# Patient Record
Sex: FEMALE | Race: BLACK OR AFRICAN AMERICAN | NOT HISPANIC OR LATINO | Employment: FULL TIME | URBAN - METROPOLITAN AREA
[De-identification: names, ages, dates, MRNs, and addresses within clinical notes are randomized per-mention and may not be internally consistent; named-entity substitution may affect disease eponyms.]

---

## 2017-03-23 ENCOUNTER — HISTORICAL (OUTPATIENT)
Dept: ADMINISTRATIVE | Facility: HOSPITAL | Age: 57
End: 2017-03-23

## 2017-07-27 ENCOUNTER — HISTORICAL (OUTPATIENT)
Dept: ADMINISTRATIVE | Facility: HOSPITAL | Age: 57
End: 2017-07-27

## 2017-09-22 ENCOUNTER — HISTORICAL (OUTPATIENT)
Dept: ADMINISTRATIVE | Facility: HOSPITAL | Age: 57
End: 2017-09-22

## 2017-10-19 ENCOUNTER — HISTORICAL (OUTPATIENT)
Dept: RADIOLOGY | Facility: HOSPITAL | Age: 57
End: 2017-10-19

## 2017-11-08 ENCOUNTER — HISTORICAL (OUTPATIENT)
Dept: ADMINISTRATIVE | Facility: HOSPITAL | Age: 57
End: 2017-11-08

## 2018-03-22 ENCOUNTER — HISTORICAL (OUTPATIENT)
Dept: ADMINISTRATIVE | Facility: HOSPITAL | Age: 58
End: 2018-03-22

## 2018-04-16 ENCOUNTER — HISTORICAL (OUTPATIENT)
Dept: ADMINISTRATIVE | Facility: HOSPITAL | Age: 58
End: 2018-04-16

## 2018-07-20 ENCOUNTER — HISTORICAL (OUTPATIENT)
Dept: ADMINISTRATIVE | Facility: HOSPITAL | Age: 58
End: 2018-07-20

## 2019-02-25 LAB
INFLUENZA A ANTIGEN, POC: NEGATIVE
INFLUENZA B ANTIGEN, POC: NEGATIVE

## 2019-03-21 ENCOUNTER — HISTORICAL (OUTPATIENT)
Dept: ADMINISTRATIVE | Facility: HOSPITAL | Age: 59
End: 2019-03-21

## 2019-04-17 ENCOUNTER — HISTORICAL (OUTPATIENT)
Dept: RADIOLOGY | Facility: HOSPITAL | Age: 59
End: 2019-04-17

## 2019-05-13 ENCOUNTER — HISTORICAL (OUTPATIENT)
Dept: RADIOLOGY | Facility: HOSPITAL | Age: 59
End: 2019-05-13

## 2019-05-15 ENCOUNTER — HISTORICAL (OUTPATIENT)
Dept: RADIOLOGY | Facility: HOSPITAL | Age: 59
End: 2019-05-15

## 2019-06-04 ENCOUNTER — HISTORICAL (OUTPATIENT)
Dept: PREADMISSION TESTING | Facility: HOSPITAL | Age: 59
End: 2019-06-04

## 2019-06-13 ENCOUNTER — HISTORICAL (OUTPATIENT)
Dept: RADIOLOGY | Facility: HOSPITAL | Age: 59
End: 2019-06-13

## 2019-06-14 ENCOUNTER — HISTORICAL (OUTPATIENT)
Dept: ADMINISTRATIVE | Facility: HOSPITAL | Age: 59
End: 2019-06-14

## 2019-06-28 ENCOUNTER — HISTORICAL (OUTPATIENT)
Dept: ADMINISTRATIVE | Facility: HOSPITAL | Age: 59
End: 2019-06-28

## 2019-07-11 ENCOUNTER — HISTORICAL (OUTPATIENT)
Dept: RADIOLOGY | Facility: HOSPITAL | Age: 59
End: 2019-07-11

## 2019-07-11 LAB — BMD RECOMMENDATION EXT: NORMAL

## 2019-08-05 ENCOUNTER — HISTORICAL (OUTPATIENT)
Dept: ADMINISTRATIVE | Facility: HOSPITAL | Age: 59
End: 2019-08-05

## 2019-11-25 ENCOUNTER — HISTORICAL (OUTPATIENT)
Dept: RADIOLOGY | Facility: HOSPITAL | Age: 59
End: 2019-11-25

## 2019-11-25 LAB — POC CREATININE: 1.1 MG/DL (ref 0.6–1.3)

## 2020-02-11 ENCOUNTER — HISTORICAL (OUTPATIENT)
Dept: RADIOLOGY | Facility: HOSPITAL | Age: 60
End: 2020-02-11

## 2020-03-10 ENCOUNTER — HISTORICAL (OUTPATIENT)
Dept: ADMINISTRATIVE | Facility: HOSPITAL | Age: 60
End: 2020-03-10

## 2020-04-29 ENCOUNTER — HISTORICAL (OUTPATIENT)
Dept: ADMINISTRATIVE | Facility: HOSPITAL | Age: 60
End: 2020-04-29

## 2020-04-29 LAB
ABS NEUT (OLG): 6.15 X10(3)/MCL (ref 2.1–9.2)
ALBUMIN SERPL-MCNC: 3.9 GM/DL (ref 3.4–4.8)
ALBUMIN/GLOB SERPL: 1.1 RATIO (ref 1.1–2)
ALP SERPL-CCNC: 104 UNIT/L (ref 40–150)
ALT SERPL-CCNC: 12 UNIT/L (ref 0–55)
AST SERPL-CCNC: 20 UNIT/L (ref 5–34)
BASOPHILS # BLD AUTO: 0.1 X10(3)/MCL (ref 0–0.2)
BASOPHILS NFR BLD AUTO: 1 %
BILIRUB SERPL-MCNC: 1.1 MG/DL
BILIRUBIN DIRECT+TOT PNL SERPL-MCNC: 0.4 MG/DL (ref 0–0.5)
BILIRUBIN DIRECT+TOT PNL SERPL-MCNC: 0.7 MG/DL (ref 0–0.8)
BUN SERPL-MCNC: 14 MG/DL (ref 9.8–20.1)
CALCIUM SERPL-MCNC: 9.4 MG/DL (ref 8.4–10.2)
CHLORIDE SERPL-SCNC: 104 MMOL/L (ref 98–107)
CHOLEST SERPL-MCNC: 217 MG/DL
CHOLEST/HDLC SERPL: 4 {RATIO} (ref 0–5)
CO2 SERPL-SCNC: 30 MMOL/L (ref 23–31)
CREAT SERPL-MCNC: 0.91 MG/DL (ref 0.55–1.02)
DEPRECATED CALCIDIOL+CALCIFEROL SERPL-MC: 23.6 NG/ML (ref 6.6–49.9)
EOSINOPHIL # BLD AUTO: 0.2 X10(3)/MCL (ref 0–0.9)
EOSINOPHIL NFR BLD AUTO: 2 %
ERYTHROCYTE [DISTWIDTH] IN BLOOD BY AUTOMATED COUNT: 14.2 % (ref 11.5–17)
GLOBULIN SER-MCNC: 3.4 GM/DL (ref 2.4–3.5)
GLUCOSE SERPL-MCNC: 100 MG/DL (ref 82–115)
HCT VFR BLD AUTO: 40.3 % (ref 37–47)
HDLC SERPL-MCNC: 56 MG/DL (ref 35–60)
HGB BLD-MCNC: 13.5 GM/DL (ref 12–16)
LDLC SERPL CALC-MCNC: 132 MG/DL (ref 50–140)
LYMPHOCYTES # BLD AUTO: 2.6 X10(3)/MCL (ref 0.6–4.6)
LYMPHOCYTES NFR BLD AUTO: 27 %
MCH RBC QN AUTO: 28.3 PG (ref 27–31)
MCHC RBC AUTO-ENTMCNC: 33.5 GM/DL (ref 33–36)
MCV RBC AUTO: 84.5 FL (ref 80–94)
MONOCYTES # BLD AUTO: 0.6 X10(3)/MCL (ref 0.1–1.3)
MONOCYTES NFR BLD AUTO: 6 %
NEUTROPHILS # BLD AUTO: 6.15 X10(3)/MCL (ref 2.1–9.2)
NEUTROPHILS NFR BLD AUTO: 63 %
PLATELET # BLD AUTO: 327 X10(3)/MCL (ref 130–400)
PMV BLD AUTO: 10.4 FL (ref 9.4–12.4)
POTASSIUM SERPL-SCNC: 4.4 MMOL/L (ref 3.5–5.1)
PROT SERPL-MCNC: 7.3 GM/DL (ref 5.8–7.6)
RBC # BLD AUTO: 4.77 X10(6)/MCL (ref 4.2–5.4)
SODIUM SERPL-SCNC: 140 MMOL/L (ref 136–145)
TRIGL SERPL-MCNC: 143 MG/DL (ref 37–140)
TSH SERPL-ACNC: 0.4 UIU/ML (ref 0.35–4.94)
VLDLC SERPL CALC-MCNC: 29 MG/DL
WBC # SPEC AUTO: 9.7 X10(3)/MCL (ref 4.5–11.5)

## 2020-05-14 ENCOUNTER — HISTORICAL (OUTPATIENT)
Dept: HEMATOLOGY/ONCOLOGY | Facility: CLINIC | Age: 60
End: 2020-05-14

## 2020-05-14 ENCOUNTER — HISTORICAL (OUTPATIENT)
Dept: LAB | Facility: HOSPITAL | Age: 60
End: 2020-05-14

## 2020-05-14 LAB
ABS NEUT (OLG): 7.4 X10(3)/MCL (ref 2.1–9.2)
ALBUMIN SERPL-MCNC: 3.9 GM/DL (ref 3.4–4.8)
ALBUMIN/GLOB SERPL: 1.2 RATIO (ref 1.1–2)
ALP SERPL-CCNC: 101 UNIT/L (ref 40–150)
ALT SERPL-CCNC: 11 UNIT/L (ref 0–55)
AST SERPL-CCNC: 19 UNIT/L (ref 5–34)
BASOPHILS # BLD AUTO: 0 X10(3)/MCL (ref 0–0.2)
BASOPHILS NFR BLD AUTO: 0.5 %
BILIRUB SERPL-MCNC: 0.8 MG/DL
BILIRUBIN DIRECT+TOT PNL SERPL-MCNC: 0.3 MG/DL (ref 0–0.5)
BILIRUBIN DIRECT+TOT PNL SERPL-MCNC: 0.5 MG/DL (ref 0–0.8)
BUN SERPL-MCNC: 13.4 MG/DL (ref 9.8–20.1)
CALCIUM SERPL-MCNC: 9.3 MG/DL (ref 8.4–10.2)
CHLORIDE SERPL-SCNC: 105 MMOL/L (ref 98–107)
CO2 SERPL-SCNC: 32 MMOL/L (ref 23–31)
CREAT SERPL-MCNC: 0.9 MG/DL (ref 0.55–1.02)
EOSINOPHIL # BLD AUTO: 0.1 X10(3)/MCL (ref 0–0.9)
EOSINOPHIL NFR BLD AUTO: 1.2 %
ERYTHROCYTE [DISTWIDTH] IN BLOOD BY AUTOMATED COUNT: 13.6 % (ref 11.5–17)
GLOBULIN SER-MCNC: 3.2 GM/DL (ref 2.4–3.5)
GLUCOSE SERPL-MCNC: 71 MG/DL (ref 82–115)
HCT VFR BLD AUTO: 38.5 % (ref 37–47)
HGB BLD-MCNC: 13.2 GM/DL (ref 12–16)
LYMPHOCYTES # BLD AUTO: 2.8 X10(3)/MCL (ref 0.6–4.6)
LYMPHOCYTES NFR BLD AUTO: 25.5 %
MCH RBC QN AUTO: 28.4 PG (ref 27–31)
MCHC RBC AUTO-ENTMCNC: 34.3 GM/DL (ref 33–36)
MCV RBC AUTO: 83 FL (ref 80–94)
MONOCYTES # BLD AUTO: 0.6 X10(3)/MCL (ref 0.1–1.3)
MONOCYTES NFR BLD AUTO: 5.8 %
NEUTROPHILS # BLD AUTO: 7.4 X10(3)/MCL (ref 2.1–9.2)
NEUTROPHILS NFR BLD AUTO: 66.7 %
PLATELET # BLD AUTO: 313 X10(3)/MCL (ref 130–400)
PMV BLD AUTO: 9.8 FL (ref 9.4–12.4)
POTASSIUM SERPL-SCNC: 4 MMOL/L (ref 3.5–5.1)
PROT SERPL-MCNC: 7.1 GM/DL (ref 5.8–7.6)
RBC # BLD AUTO: 4.64 X10(6)/MCL (ref 4.2–5.4)
SODIUM SERPL-SCNC: 143 MMOL/L (ref 136–145)
WBC # SPEC AUTO: 11.1 X10(3)/MCL (ref 4.5–11.5)

## 2020-10-02 LAB — CRC RECOMMENDATION EXT: NORMAL

## 2020-11-17 ENCOUNTER — HISTORICAL (OUTPATIENT)
Dept: HEMATOLOGY/ONCOLOGY | Facility: CLINIC | Age: 60
End: 2020-11-17

## 2020-12-01 ENCOUNTER — HISTORICAL (OUTPATIENT)
Dept: HEMATOLOGY/ONCOLOGY | Facility: CLINIC | Age: 60
End: 2020-12-01

## 2020-12-07 ENCOUNTER — HISTORICAL (OUTPATIENT)
Dept: LAB | Facility: HOSPITAL | Age: 60
End: 2020-12-07

## 2021-01-14 ENCOUNTER — HISTORICAL (OUTPATIENT)
Dept: ADMINISTRATIVE | Facility: HOSPITAL | Age: 61
End: 2021-01-14

## 2021-02-08 ENCOUNTER — HISTORICAL (OUTPATIENT)
Dept: HEMATOLOGY/ONCOLOGY | Facility: CLINIC | Age: 61
End: 2021-02-08

## 2021-05-11 ENCOUNTER — HISTORICAL (OUTPATIENT)
Dept: ADMINISTRATIVE | Facility: HOSPITAL | Age: 61
End: 2021-05-11

## 2021-08-05 ENCOUNTER — HISTORICAL (OUTPATIENT)
Dept: ADMINISTRATIVE | Facility: HOSPITAL | Age: 61
End: 2021-08-05

## 2021-09-03 ENCOUNTER — HISTORICAL (OUTPATIENT)
Dept: ADMINISTRATIVE | Facility: HOSPITAL | Age: 61
End: 2021-09-03

## 2021-11-03 ENCOUNTER — HISTORICAL (OUTPATIENT)
Dept: ADMINISTRATIVE | Facility: HOSPITAL | Age: 61
End: 2021-11-03

## 2021-11-05 ENCOUNTER — HISTORICAL (OUTPATIENT)
Dept: RADIOLOGY | Facility: HOSPITAL | Age: 61
End: 2021-11-05

## 2021-11-09 ENCOUNTER — HISTORICAL (OUTPATIENT)
Dept: RADIOLOGY | Facility: HOSPITAL | Age: 61
End: 2021-11-09

## 2021-12-07 ENCOUNTER — HISTORICAL (OUTPATIENT)
Dept: ADMINISTRATIVE | Facility: HOSPITAL | Age: 61
End: 2021-12-07

## 2022-02-10 ENCOUNTER — HISTORICAL (OUTPATIENT)
Dept: HEMATOLOGY/ONCOLOGY | Facility: CLINIC | Age: 62
End: 2022-02-10

## 2022-02-10 LAB
ABS NEUT (OLG): 8.14 (ref 2.1–9.2)
ALBUMIN SERPL-MCNC: 3.9 G/DL (ref 3.4–4.8)
ALBUMIN/GLOB SERPL: 1.2 {RATIO} (ref 1.1–2)
ALP SERPL-CCNC: 106 U/L (ref 40–150)
ALT SERPL-CCNC: 15 U/L (ref 0–55)
AST SERPL-CCNC: 22 U/L (ref 5–34)
BASOPHILS # BLD AUTO: 0.1 10*3/UL (ref 0–0.2)
BASOPHILS NFR BLD AUTO: 0.6 %
BILIRUB SERPL-MCNC: 0.8 MG/DL
BILIRUBIN DIRECT+TOT PNL SERPL-MCNC: 0.3 (ref 0–0.5)
BILIRUBIN DIRECT+TOT PNL SERPL-MCNC: 0.5 (ref 0–0.8)
BUN SERPL-MCNC: 11.4 MG/DL (ref 9.8–20.1)
CALCIUM SERPL-MCNC: 9.5 MG/DL (ref 8.7–10.5)
CHLORIDE SERPL-SCNC: 105 MMOL/L (ref 98–107)
CO2 SERPL-SCNC: 26 MMOL/L (ref 23–31)
CREAT SERPL-MCNC: 0.85 MG/DL (ref 0.55–1.02)
EOSINOPHIL # BLD AUTO: 0.2 10*3/UL (ref 0–0.9)
EOSINOPHIL NFR BLD AUTO: 1.2 %
ERYTHROCYTE [DISTWIDTH] IN BLOOD BY AUTOMATED COUNT: 13.4 % (ref 11.5–17)
GLOBULIN SER-MCNC: 3.2 G/DL (ref 2.4–3.5)
GLUCOSE SERPL-MCNC: 88 MG/DL (ref 82–115)
HCT VFR BLD AUTO: 38 % (ref 37–47)
HEMOLYSIS INTERF INDEX SERPL-ACNC: 7
HGB BLD-MCNC: 13.1 G/DL (ref 12–16)
ICTERIC INTERF INDEX SERPL-ACNC: 1
LIPEMIC INTERF INDEX SERPL-ACNC: 13
LYMPHOCYTES # BLD AUTO: 3 10*3/UL (ref 0.6–4.6)
LYMPHOCYTES NFR BLD AUTO: 24.6 %
MANUAL DIFF? (OHS): NO
MCH RBC QN AUTO: 28.5 PG (ref 27–31)
MCHC RBC AUTO-ENTMCNC: 34.5 G/DL (ref 33–36)
MCV RBC AUTO: 82.8 FL (ref 80–94)
MONOCYTES # BLD AUTO: 0.8 10*3/UL (ref 0.1–1.3)
MONOCYTES NFR BLD AUTO: 6.3 %
NEUTROPHILS # BLD AUTO: 8.1 10*3/UL (ref 2.1–9.2)
NEUTROPHILS NFR BLD AUTO: 67.1 %
PLATELET # BLD AUTO: 342 10*3/UL (ref 130–400)
PMV BLD AUTO: 9.6 FL (ref 9.4–12.4)
POTASSIUM SERPL-SCNC: 3.8 MMOL/L (ref 3.5–5.1)
PROT SERPL-MCNC: 7.1 G/DL (ref 5.8–7.6)
RBC # BLD AUTO: 4.59 10*6/UL (ref 4.2–5.4)
SODIUM SERPL-SCNC: 141 MMOL/L (ref 136–145)
WBC # SPEC AUTO: 12.1 10*3/UL (ref 4.5–11.5)

## 2022-02-15 ENCOUNTER — HISTORICAL (OUTPATIENT)
Dept: RADIOLOGY | Facility: HOSPITAL | Age: 62
End: 2022-02-15

## 2022-02-15 ENCOUNTER — HISTORICAL (OUTPATIENT)
Dept: ADMINISTRATIVE | Facility: HOSPITAL | Age: 62
End: 2022-02-15

## 2022-02-15 LAB — BCS RECOMMENDATION EXT: NORMAL

## 2022-04-07 ENCOUNTER — HISTORICAL (OUTPATIENT)
Dept: ADMINISTRATIVE | Facility: HOSPITAL | Age: 62
End: 2022-04-07
Payer: COMMERCIAL

## 2022-04-11 ENCOUNTER — HISTORICAL (OUTPATIENT)
Dept: ADMINISTRATIVE | Facility: HOSPITAL | Age: 62
End: 2022-04-11

## 2022-04-11 LAB — TSH SERPL-ACNC: 2.11 M[IU]/L (ref 0.35–4.94)

## 2022-04-23 VITALS
HEIGHT: 60 IN | BODY MASS INDEX: 39.34 KG/M2 | DIASTOLIC BLOOD PRESSURE: 74 MMHG | OXYGEN SATURATION: 96 % | WEIGHT: 200.38 LBS | SYSTOLIC BLOOD PRESSURE: 112 MMHG

## 2022-05-01 NOTE — HISTORICAL [facility] C[facility]ER
This is a historical note converted from [facility]. Formatting and pictures may have been removed.  Please reference [facility] for original formatting and attached multimedia. DATE OF SERVICE: 6/14/2019  ?   SURGEON: Dr. Rosario Cook  ?   ASSIST: None  ?   PREOPERATIVE DIAGNOSIS: Left breast atypical ductal hyperplasia  ?  POSTOPERATIVE DIAGNOSIS: Left breast atypical ductal hyperplasia  ?  PROCEDURE: Left breast excisional biopsy with preop seed localization  ?   ANESTHESIA: General + 30ml of 0.5% Bupivacaine  ?  ESTIMATED BLOOD LOSS: 3 mL  ?  FINDINGS: Left breast tissue with seed, clip and area of concern  ?  SPECIMEN: Left breast excisional biopsy  ?  DRAINS: None  ?  COMPLICATIONS: None  ?  PROCEDURE INDICATION:  Shahnaz Chirinos is a pleasant 59 year old female who presents with left breast atypical ductal hyperplasia on core needle biopsy but the pathologist could not rule out ductal carcinoma in situ.  ?  Her imaging studies were reviewed in detail to facilitate discussion. At present her imaging is BIRADS-4 further biopsy shows a Left atypical ductal hyperplasia?and cannot rule out?DCIS. This is a proliferative high risk lesion and may have an upgrade of 15-20% to a cancer diagnosis on excisional biopsy and there is already concern for malignancy based on the core biopsy. Surgical biopsy can be done on outpatient basis under local anesthesia and sedation. The risks and complications of pain, bleeding, infection, hematoma, seroma, additional surgery, and scarring were explained. The details of the surgery were described in depth. All of the patients questions were answered. We will schedule her for an excisional biopsy.?  ?  PROCEDURE DESCRIPTION:  The patient was then brought to the operating room and placed supine on the operative table. General anesthesia was initiated. The skin of the?Left breast was prepped and draped in standard sterile surgical fashion along with the Left axilla and upper arm. A time-out was  completed verifying correct patient, procedure, site, positioning and equipment prior to beginning the procedure.?  ?  An circumareolar?incision was planned of the Left breast. The incision was planned such that the seed may be included into the excision field. The incision was made using a 15-blade. The subcutaneous tissue was deepened using electrocautery. Hemostasis was checked and maintained. The seed was located within the surgical field. The dissection was carried out circumferentially to include the seed. The specimen was then completed dissected free. Using intra-operative imaging with Faxitron, an x-ray film of the specimen was taken and reviewed. It was confirmed that the seed, clip?and lesion were within the specimen. The specimen was then sent to pathology. Hemostasis was again checked and obtained. Irrigation was performed of the cavity.  ?  The cavity was closed with interrupted 3-0 Vicryl sutures. The subdermal was closed with 3-0 Vicryl and 4-0 subcuticular running skin closure. Dermabond was applied over the incision followed by 4x4 gauze and surgical bra  ?  All instruments and sponge counts were correct at the end of the procedure. The patient was awaken from anesthesia and taken to the post-anesthesia care unit in stable condition.

## 2022-06-07 ENCOUNTER — DOCUMENTATION ONLY (OUTPATIENT)
Dept: ADMINISTRATIVE | Facility: HOSPITAL | Age: 62
End: 2022-06-07
Payer: COMMERCIAL

## 2022-06-10 ENCOUNTER — DOCUMENTATION ONLY (OUTPATIENT)
Dept: ADMINISTRATIVE | Facility: HOSPITAL | Age: 62
End: 2022-06-10
Payer: COMMERCIAL

## 2022-06-17 ENCOUNTER — DOCUMENTATION ONLY (OUTPATIENT)
Dept: ADMINISTRATIVE | Facility: HOSPITAL | Age: 62
End: 2022-06-17
Payer: COMMERCIAL

## 2022-07-05 ENCOUNTER — TELEPHONE (OUTPATIENT)
Dept: INTERNAL MEDICINE | Facility: CLINIC | Age: 62
End: 2022-07-05
Payer: COMMERCIAL

## 2022-07-05 RX ORDER — AZITHROMYCIN 250 MG/1
TABLET, FILM COATED ORAL
Qty: 6 TABLET | Refills: 0 | Status: SHIPPED | OUTPATIENT
Start: 2022-07-05 | End: 2022-07-10

## 2022-07-05 RX ORDER — AZITHROMYCIN 250 MG/1
TABLET, FILM COATED ORAL
Qty: 6 TABLET | Refills: 0 | Status: SHIPPED | OUTPATIENT
Start: 2022-07-05 | End: 2022-07-05

## 2022-07-05 NOTE — TELEPHONE ENCOUNTER
----- Message from Shonna Hampton MA sent at 7/5/2022  3:56 PM CDT -----  Contact: Patient    ----- Message -----  From: Boone Lynne  Sent: 7/1/2022   3:01 PM CDT  To: [facility] CHAVEZ Staff    The pt called and would like to have someone call her back     She has a sinus infection and would like to know if you would prescribe a Z-Pac for her    Please call her back at [phone]

## 2022-07-05 NOTE — TELEPHONE ENCOUNTER
Medications Ordered This Encounter   Medications    azithromycin (Z-DAVIAN) 250 MG tablet     Sig: Take 2 tablets by mouth on day 1; Take 1 tablet by mouth on days 2-5     Dispense:  6 tablet     Refill:  0     Sent

## 2022-07-05 NOTE — TELEPHONE ENCOUNTER
----- Message from Shonna Hampton MA sent at 7/5/2022  8:43 AM CDT -----  Contact: Patient    ----- Message -----  From: Boone Lynne  Sent: 7/1/2022   3:01 PM CDT  To: [facility] CHAVEZ Staff    The pt called and would like to have someone call her back     She has a sinus infection and would like to know if you would prescribe a Z-Pac for her    Please call her back at [phone]

## 2022-07-05 NOTE — TELEPHONE ENCOUNTER
Medications Ordered This Encounter   Medications    azithromycin (Z-DAVIAN) 250 MG tablet     Sig: Take 2 tablets by mouth on day 1; Take 1 tablet by mouth on days 2-5     Dispense:  6 tablet     Refill:  0    please notify

## 2022-07-11 ENCOUNTER — TELEPHONE (OUTPATIENT)
Dept: INTERNAL MEDICINE | Facility: CLINIC | Age: 62
End: 2022-07-11
Payer: COMMERCIAL

## 2022-07-11 NOTE — TELEPHONE ENCOUNTER
----- Message from Margarita Oakley MA sent at 7/1/2022  8:51 AM CDT -----  Regarding: Rx sent to pharmacy  Pt called with c/o HA,runny nose, sneezing, and sore throat. Pt want to know if something can be called into her pharmacy? Please advise

## 2022-08-15 PROBLEM — E55.9 VITAMIN D DEFICIENCY: Status: ACTIVE | Noted: 2022-08-15

## 2022-08-15 PROBLEM — D05.10 DUCTAL CARCINOMA IN SITU (DCIS) OF BREAST: Status: ACTIVE | Noted: 2022-08-15

## 2022-08-15 PROBLEM — K21.9 GASTRIC REFLUX: Status: ACTIVE | Noted: 2022-08-15

## 2022-08-15 PROBLEM — E03.4 ACQUIRED ATROPHY OF THYROID: Status: ACTIVE | Noted: 2022-08-15

## 2022-08-15 PROBLEM — E66.9 OBESITY: Status: ACTIVE | Noted: 2022-08-15

## 2022-08-15 PROBLEM — Z00.00 WELLNESS EXAMINATION: Status: ACTIVE | Noted: 2022-08-15

## 2022-08-16 DIAGNOSIS — E03.9 ACQUIRED HYPOTHYROIDISM: Primary | ICD-10-CM

## 2022-08-17 ENCOUNTER — OFFICE VISIT (OUTPATIENT)
Dept: INTERNAL MEDICINE | Facility: CLINIC | Age: 62
End: 2022-08-17
Payer: COMMERCIAL

## 2022-08-17 VITALS
OXYGEN SATURATION: 99 % | HEART RATE: 74 BPM | TEMPERATURE: 97 F | HEIGHT: 61 IN | BODY MASS INDEX: 36.82 KG/M2 | DIASTOLIC BLOOD PRESSURE: 84 MMHG | WEIGHT: 195 LBS | SYSTOLIC BLOOD PRESSURE: 123 MMHG

## 2022-08-17 DIAGNOSIS — K21.9 GASTRIC REFLUX: Primary | ICD-10-CM

## 2022-08-17 DIAGNOSIS — E55.9 VITAMIN D DEFICIENCY: ICD-10-CM

## 2022-08-17 DIAGNOSIS — E03.9 ACQUIRED HYPOTHYROIDISM: ICD-10-CM

## 2022-08-17 DIAGNOSIS — E03.9 ACQUIRED HYPOTHYROIDISM: Primary | ICD-10-CM

## 2022-08-17 DIAGNOSIS — Z00.00 WELLNESS EXAMINATION: ICD-10-CM

## 2022-08-17 PROCEDURE — 99214 OFFICE O/P EST MOD 30 MIN: CPT | Mod: ,,, | Performed by: INTERNAL MEDICINE

## 2022-08-17 PROCEDURE — 3079F PR MOST RECENT DIASTOLIC BLOOD PRESSURE 80-89 MM HG: ICD-10-PCS | Mod: CPTII,,, | Performed by: INTERNAL MEDICINE

## 2022-08-17 PROCEDURE — 3008F PR BODY MASS INDEX (BMI) DOCUMENTED: ICD-10-PCS | Mod: CPTII,,, | Performed by: INTERNAL MEDICINE

## 2022-08-17 PROCEDURE — 1159F MED LIST DOCD IN RCRD: CPT | Mod: CPTII,,, | Performed by: INTERNAL MEDICINE

## 2022-08-17 PROCEDURE — 1160F PR REVIEW ALL MEDS BY PRESCRIBER/CLIN PHARMACIST DOCUMENTED: ICD-10-PCS | Mod: CPTII,,, | Performed by: INTERNAL MEDICINE

## 2022-08-17 PROCEDURE — 1160F RVW MEDS BY RX/DR IN RCRD: CPT | Mod: CPTII,,, | Performed by: INTERNAL MEDICINE

## 2022-08-17 PROCEDURE — 3079F DIAST BP 80-89 MM HG: CPT | Mod: CPTII,,, | Performed by: INTERNAL MEDICINE

## 2022-08-17 PROCEDURE — 3074F SYST BP LT 130 MM HG: CPT | Mod: CPTII,,, | Performed by: INTERNAL MEDICINE

## 2022-08-17 PROCEDURE — 3008F BODY MASS INDEX DOCD: CPT | Mod: CPTII,,, | Performed by: INTERNAL MEDICINE

## 2022-08-17 PROCEDURE — 1159F PR MEDICATION LIST DOCUMENTED IN MEDICAL RECORD: ICD-10-PCS | Mod: CPTII,,, | Performed by: INTERNAL MEDICINE

## 2022-08-17 PROCEDURE — 3074F PR MOST RECENT SYSTOLIC BLOOD PRESSURE < 130 MM HG: ICD-10-PCS | Mod: CPTII,,, | Performed by: INTERNAL MEDICINE

## 2022-08-17 PROCEDURE — 99214 PR OFFICE/OUTPT VISIT, EST, LEVL IV, 30-39 MIN: ICD-10-PCS | Mod: ,,, | Performed by: INTERNAL MEDICINE

## 2022-08-17 NOTE — PROGRESS NOTES
Subjective:      Patient ID: Shahnaz Chirinos is a 62 y.o. female.    Chief Complaint: Follow-up (4 month f/u)    Ms. Christie is a 62-year-old female with hypothyroidism, vitamin D deficiency, GERD, DDD and atypical ductal hyperplasia of her left breast for which she is currently on Femara after excision of her breast lesion. Also has obstructive sleep apnea and needs to get her CPAP order completed.  No acute needs or complaints today.   TSH is reviewed and is essentially normal    Wellness: 2021  MM2022  CRS: 10/2021  DEXA: 2019  Pap: Hysterectomy      The patient's Health Maintenance was reviewed and the following appears to be due at this time:   Health Maintenance Due   Topic Date Due    Hepatitis C Screening  Never done    HIV Screening  Never done    TETANUS VACCINE  Never done    Shingles Vaccine (1 of 2) Never done    COVID-19 Vaccine (3 - Booster for Pfizer series) 2021        Past Medical History:  Past Medical History:   Diagnosis Date    Abdominal pain     Achilles tendonitis     Acquired hypothyroidism     Acute sinusitis     Cholelithiasis     DCIS (ductal carcinoma in situ)     Gastric reflux     Malaise and fatigue     Osteopenia     Vitamin D deficiency      Past Surgical History:   Procedure Laterality Date     SECTION      EXC. OF BREAST LESION ID BY RADIOLOGICAL Left     KNEE SURGERY      LAPAROSCOPIC CHOLECYSTECTOMY      TOTAL ABDOMINAL HYSTERECTOMY      Per [facility]     Review of patient's allergies indicates:  No Known Allergies  Social History     Socioeconomic History    Marital status:    Tobacco Use    Smoking status: Never Smoker    Smokeless tobacco: Never Used   Substance and Sexual Activity    Alcohol use: Yes     Family History   Problem Relation Age of Onset    Heart failure Mother     COPD Mother     Cancer Father     Diabetes Brother     Peripheral vascular disease Brother     Lung cancer Brother        Review of  "Systems   Constitutional: Negative for activity change, appetite change and fatigue.   HENT: Negative for postnasal drip, rhinorrhea, sinus pain and sore throat.    Eyes: Negative for visual disturbance.   Respiratory: Negative for cough, shortness of breath and wheezing.    Cardiovascular: Negative for chest pain and palpitations.   Gastrointestinal: Negative for abdominal distention, blood in stool, constipation, diarrhea, nausea and vomiting.   Genitourinary: Negative for dysuria, flank pain, frequency and hematuria.   Musculoskeletal: Negative for arthralgias, back pain and joint swelling.   Skin: Negative for rash and wound.   Neurological: Negative for dizziness, seizures, weakness and headaches.   Psychiatric/Behavioral: Negative for agitation and suicidal ideas.       Objective:   /84 (BP Location: Right arm, Patient Position: Sitting, BP Method: Large (Automatic))   Pulse 74   Temp 97.3 °F (36.3 °C)   Ht 5' 1" (1.549 m)   Wt 88.5 kg (195 lb)   SpO2 99%   BMI 36.84 kg/m²     Physical Exam  Constitutional:       Appearance: Normal appearance.   HENT:      Head: Normocephalic and atraumatic.      Nose: Nose normal.      Mouth/Throat:      Mouth: Mucous membranes are moist.      Pharynx: Oropharynx is clear.   Eyes:      Extraocular Movements: Extraocular movements intact.      Pupils: Pupils are equal, round, and reactive to light.   Cardiovascular:      Rate and Rhythm: Normal rate and regular rhythm.      Pulses: Normal pulses.   Pulmonary:      Effort: Pulmonary effort is normal.      Breath sounds: Normal breath sounds.   Abdominal:      General: Bowel sounds are normal.      Palpations: Abdomen is soft.   Musculoskeletal:         General: Normal range of motion.      Cervical back: Normal range of motion and neck supple.   Skin:     General: Skin is warm.   Neurological:      General: No focal deficit present.      Mental Status: She is alert and oriented to person, place, and time. Mental " status is at baseline.   Psychiatric:         Mood and Affect: Mood normal.       Assessment/ Plan:     Problem List Items Addressed This Visit        Endocrine    Acquired hypothyroidism     -TSH is reviewed and is well controlled at the current dose of Synthroid 75 mcg p.o. daily, continue              GI    Gastric reflux - Primary     -patient advised to avoid foods that trigger acid reflux and he had at least 2 hours before bedtime.                 No orders of the defined types were placed in this encounter.      Medication List with Changes/Refills   Current Medications    ASPIRIN (ECOTRIN) 81 MG EC TABLET    Take 81 mg by mouth.    CHOLECALCIFEROL, VITAMIN D3, 125 MCG (5,000 UNIT) TAB    Take 5,000 Units by mouth once daily.    FAMOTIDINE (PEPCID) 20 MG TABLET    Take 20 mg by mouth.    LETROZOLE (FEMARA) 2.5 MG TAB    Take 2.5 mg by mouth once daily.    LEVOTHYROXINE (SYNTHROID) 75 MCG TABLET    Take 75 mcg by mouth before breakfast.      Medication List with Changes/Refills   Current Medications    ASPIRIN (ECOTRIN) 81 MG EC TABLET    Take 81 mg by mouth.       Start Date: 9/1/2021  End Date: --    CHOLECALCIFEROL, VITAMIN D3, 125 MCG (5,000 UNIT) TAB    Take 5,000 Units by mouth once daily.       Start Date: --        End Date: --    FAMOTIDINE (PEPCID) 20 MG TABLET    Take 20 mg by mouth.       Start Date: 9/1/2021  End Date: --    LETROZOLE (FEMARA) 2.5 MG TAB    Take 2.5 mg by mouth once daily.       Start Date: --        End Date: --    LEVOTHYROXINE (SYNTHROID) 75 MCG TABLET    Take 75 mcg by mouth before breakfast.       Start Date: --        End Date: --

## 2022-08-18 NOTE — ASSESSMENT & PLAN NOTE
-TSH is reviewed and is well controlled at the current dose of Synthroid 75 mcg p.o. daily, continue

## 2022-09-15 ENCOUNTER — HISTORICAL (OUTPATIENT)
Dept: ADMINISTRATIVE | Facility: HOSPITAL | Age: 62
End: 2022-09-15
Payer: COMMERCIAL

## 2022-09-27 ENCOUNTER — OFFICE VISIT (OUTPATIENT)
Dept: INTERNAL MEDICINE | Facility: CLINIC | Age: 62
End: 2022-09-27
Payer: COMMERCIAL

## 2022-09-27 ENCOUNTER — PATIENT MESSAGE (OUTPATIENT)
Dept: INTERNAL MEDICINE | Facility: CLINIC | Age: 62
End: 2022-09-27

## 2022-09-27 VITALS
HEIGHT: 61 IN | OXYGEN SATURATION: 98 % | WEIGHT: 195 LBS | DIASTOLIC BLOOD PRESSURE: 74 MMHG | TEMPERATURE: 97 F | BODY MASS INDEX: 36.82 KG/M2 | SYSTOLIC BLOOD PRESSURE: 136 MMHG | HEART RATE: 73 BPM

## 2022-09-27 DIAGNOSIS — H66.91 OTITIS, RIGHT: ICD-10-CM

## 2022-09-27 DIAGNOSIS — H66.92 OTITIS, LEFT: Primary | ICD-10-CM

## 2022-09-27 DIAGNOSIS — E03.8 TSH (THYROID-STIMULATING HORMONE DEFICIENCY): Primary | ICD-10-CM

## 2022-09-27 DIAGNOSIS — D05.10 DUCTAL CARCINOMA IN SITU (DCIS) OF BREAST, UNSPECIFIED LATERALITY: ICD-10-CM

## 2022-09-27 PROCEDURE — 3078F PR MOST RECENT DIASTOLIC BLOOD PRESSURE < 80 MM HG: ICD-10-PCS | Mod: CPTII,,,

## 2022-09-27 PROCEDURE — 1159F PR MEDICATION LIST DOCUMENTED IN MEDICAL RECORD: ICD-10-PCS | Mod: CPTII,,,

## 2022-09-27 PROCEDURE — 99213 OFFICE O/P EST LOW 20 MIN: CPT | Mod: ,,,

## 2022-09-27 PROCEDURE — 1160F PR REVIEW ALL MEDS BY PRESCRIBER/CLIN PHARMACIST DOCUMENTED: ICD-10-PCS | Mod: CPTII,,,

## 2022-09-27 PROCEDURE — 3008F BODY MASS INDEX DOCD: CPT | Mod: CPTII,,,

## 2022-09-27 PROCEDURE — 3078F DIAST BP <80 MM HG: CPT | Mod: CPTII,,,

## 2022-09-27 PROCEDURE — 1159F MED LIST DOCD IN RCRD: CPT | Mod: CPTII,,,

## 2022-09-27 PROCEDURE — 99213 PR OFFICE/OUTPT VISIT, EST, LEVL III, 20-29 MIN: ICD-10-PCS | Mod: ,,,

## 2022-09-27 PROCEDURE — 3075F SYST BP GE 130 - 139MM HG: CPT | Mod: CPTII,,,

## 2022-09-27 PROCEDURE — 1160F RVW MEDS BY RX/DR IN RCRD: CPT | Mod: CPTII,,,

## 2022-09-27 PROCEDURE — 3008F PR BODY MASS INDEX (BMI) DOCUMENTED: ICD-10-PCS | Mod: CPTII,,,

## 2022-09-27 PROCEDURE — 3075F PR MOST RECENT SYSTOLIC BLOOD PRESS GE 130-139MM HG: ICD-10-PCS | Mod: CPTII,,,

## 2022-09-27 RX ORDER — LEVOTHYROXINE SODIUM 75 UG/1
75 TABLET ORAL
Qty: 90 TABLET | Refills: 1 | Status: SHIPPED | OUTPATIENT
Start: 2022-09-27 | End: 2023-10-18 | Stop reason: SDUPTHER

## 2022-09-27 RX ORDER — LETROZOLE 2.5 MG/1
2.5 TABLET, FILM COATED ORAL DAILY
Qty: 30 TABLET | Refills: 0 | Status: SHIPPED | OUTPATIENT
Start: 2022-09-27 | End: 2022-11-01

## 2022-09-27 RX ORDER — NEOMYCIN SULFATE, POLYMYXIN B SULFATE AND HYDROCORTISONE 10; 3.5; 1 MG/ML; MG/ML; [USP'U]/ML
3 SUSPENSION/ DROPS AURICULAR (OTIC) 3 TIMES DAILY
Qty: 6 ML | Refills: 0 | Status: SHIPPED | OUTPATIENT
Start: 2022-09-27 | End: 2022-10-07

## 2022-09-27 NOTE — PROGRESS NOTES
Patient ID: Shahnaz Chirinos is a 62 y.o. female.    Chief Complaint: Follow-up, Headache, and Otalgia    Ms. Christie is a 62-year-old female here today for requested visit regarding ear pain.  Medical comorbidities include hypothyroidism, vitamin D deficiency, GERD, DDD and atypical ductal hyperplasia of her left breast for which she is currently on Femara after excision of her breast lesion. Also has obstructive sleep apnea and needs to get her CPAP order completed.  Today presents with right ear pain with itching x2 days-see form below.     Otalgia   There is pain in the right ear. This is a new problem. The current episode started in the past 7 days. The problem occurs hourly. The problem has been unchanged. There has been no fever. The pain is mild. Associated symptoms include headaches. Pertinent negatives include no abdominal pain, coughing, diarrhea, ear discharge, hearing loss, rash, rhinorrhea, sore throat or vomiting. She has tried nothing for the symptoms.     Wellness: 2021  MM2022  CRS: 10/2021  DEXA: 2019  Pap: Hysterectomy    MEDICAL HISTORY:    Past Medical History:   Diagnosis Date    Abdominal pain     Achilles tendonitis     Acquired hypothyroidism     Acute sinusitis     Cholelithiasis     DCIS (ductal carcinoma in situ)     Gastric reflux     Malaise and fatigue     Osteopenia     Vitamin D deficiency       Past Surgical History:   Procedure Laterality Date     SECTION      EXC. OF BREAST LESION ID BY RADIOLOGICAL Left     KNEE SURGERY      LAPAROSCOPIC CHOLECYSTECTOMY      TOTAL ABDOMINAL HYSTERECTOMY      Per [facility]      Social History     Tobacco Use    Smoking status: Never    Smokeless tobacco: Never   Substance Use Topics    Alcohol use: Yes          Health Maintenance Due   Topic Date Due    Hepatitis C Screening  Never done    HIV Screening  Never done    TETANUS VACCINE  Never done    Shingles Vaccine (1 of 2) Never done    COVID-19 Vaccine (3 - Booster  "for Pfizer series) 03/29/2021    Influenza Vaccine (1) 09/01/2022          Patient Care Team:  Maia Oakes MD as PCP - General (Internal Medicine)  Shira Morrell MD as Consulting Physician (Hematology and Oncology)      Review of Systems   Constitutional:  Negative for fatigue and fever.   HENT:  Positive for ear pain (with itching). Negative for congestion, ear discharge, hearing loss, nosebleeds, postnasal drip, rhinorrhea, sinus pressure, sinus pain, sore throat, tinnitus and trouble swallowing.    Eyes:  Negative for redness and visual disturbance.   Respiratory:  Negative for cough, chest tightness and shortness of breath.    Cardiovascular:  Negative for chest pain and palpitations.   Gastrointestinal:  Negative for abdominal pain, constipation, diarrhea, nausea and vomiting.   Genitourinary:  Negative for dysuria, flank pain, frequency and urgency.   Musculoskeletal:  Negative for arthralgias, gait problem and myalgias.   Skin:  Negative for rash and wound.   Neurological:  Positive for headaches. Negative for facial asymmetry, speech difficulty and weakness.   All other systems reviewed and are negative.    Objective:   /74 (BP Location: Left arm, Patient Position: Sitting, BP Method: Large (Automatic))   Pulse 73   Temp 97.3 °F (36.3 °C)   Ht 5' 1" (1.549 m)   Wt 88.5 kg (195 lb)   SpO2 98%   BMI 36.84 kg/m²      Physical Exam  Constitutional:       General: She is not in acute distress.     Appearance: Normal appearance.   HENT:      Right Ear: Ear canal and external ear normal. Tenderness present. Tympanic membrane is erythematous.      Left Ear: Tympanic membrane, ear canal and external ear normal. There is impacted cerumen.      Nose: Nose normal.      Mouth/Throat:      Mouth: Mucous membranes are moist.      Pharynx: Oropharynx is clear.   Eyes:      Extraocular Movements: Extraocular movements intact.      Conjunctiva/sclera: Conjunctivae normal.      Pupils: Pupils are " equal, round, and reactive to light.   Cardiovascular:      Rate and Rhythm: Normal rate and regular rhythm.      Pulses: Normal pulses.      Heart sounds: Normal heart sounds. No murmur heard.    No gallop.   Pulmonary:      Effort: Pulmonary effort is normal.      Breath sounds: Normal breath sounds. No wheezing.   Abdominal:      General: Bowel sounds are normal. There is no distension.      Palpations: Abdomen is soft. There is no mass.      Tenderness: There is no abdominal tenderness. There is no guarding.   Musculoskeletal:         General: Normal range of motion.   Skin:     General: Skin is warm and dry.   Neurological:      Mental Status: She is alert. Mental status is at baseline.      Sensory: No sensory deficit.      Motor: No weakness.         Assessment:       ICD-10-CM ICD-9-CM   1. Otitis, left  H66.92 382.9   2. Otitis, right  H66.91 382.9        Plan:     Problem List Items Addressed This Visit          ENT    Otitis, right - Primary    Relevant Medications    neomycin-polymyxin-hydrocortisone (CORTISPORIN) 3.5-10,000-1 mg/mL-unit/mL-% otic suspension          Follow up for Previously sheduled visit and PRN if need.   -plan specifics discussed above    Orders Placed This Encounter    neomycin-polymyxin-hydrocortisone (CORTISPORIN) 3.5-10,000-1 mg/mL-unit/mL-% otic suspension        Medication List with Changes/Refills   New Medications    NEOMYCIN-POLYMYXIN-HYDROCORTISONE (CORTISPORIN) 3.5-10,000-1 MG/ML-UNIT/ML-% OTIC SUSPENSION    Place 3 drops into the right ear 3 (three) times daily. for 10 days   Current Medications    ASPIRIN (ECOTRIN) 81 MG EC TABLET    Take 81 mg by mouth.    CHOLECALCIFEROL, VITAMIN D3, 125 MCG (5,000 UNIT) TAB    Take 5,000 Units by mouth once daily.    FAMOTIDINE (PEPCID) 20 MG TABLET    Take 20 mg by mouth.    LETROZOLE (FEMARA) 2.5 MG TAB    Take 1 tablet (2.5 mg total) by mouth once daily.    LEVOTHYROXINE (SYNTHROID) 75 MCG TABLET    Take 1 tablet (75 mcg total) by  mouth before breakfast.

## 2022-11-14 PROBLEM — Z00.00 WELLNESS EXAMINATION: Status: RESOLVED | Noted: 2022-08-15 | Resolved: 2022-11-14

## 2022-11-30 DIAGNOSIS — E03.9 ACQUIRED HYPOTHYROIDISM: Primary | ICD-10-CM

## 2022-11-30 DIAGNOSIS — Z00.00 WELLNESS EXAMINATION: ICD-10-CM

## 2022-12-02 ENCOUNTER — OFFICE VISIT (OUTPATIENT)
Dept: INTERNAL MEDICINE | Facility: CLINIC | Age: 62
End: 2022-12-02
Payer: COMMERCIAL

## 2022-12-02 VITALS
DIASTOLIC BLOOD PRESSURE: 78 MMHG | SYSTOLIC BLOOD PRESSURE: 116 MMHG | RESPIRATION RATE: 16 BRPM | HEIGHT: 61 IN | HEART RATE: 69 BPM | OXYGEN SATURATION: 98 % | WEIGHT: 194 LBS | BODY MASS INDEX: 36.63 KG/M2

## 2022-12-02 DIAGNOSIS — J02.9 SORE THROAT: Primary | ICD-10-CM

## 2022-12-02 DIAGNOSIS — R51.9 ACUTE NONINTRACTABLE HEADACHE, UNSPECIFIED HEADACHE TYPE: ICD-10-CM

## 2022-12-02 DIAGNOSIS — R49.0 HOARSENESS: ICD-10-CM

## 2022-12-02 PROCEDURE — 1159F MED LIST DOCD IN RCRD: CPT | Mod: CPTII,,, | Performed by: NURSE PRACTITIONER

## 2022-12-02 PROCEDURE — 96372 PR INJECTION,THERAP/PROPH/DIAG2ST, IM OR SUBCUT: ICD-10-PCS | Mod: ,,, | Performed by: NURSE PRACTITIONER

## 2022-12-02 PROCEDURE — 1160F PR REVIEW ALL MEDS BY PRESCRIBER/CLIN PHARMACIST DOCUMENTED: ICD-10-PCS | Mod: CPTII,,, | Performed by: NURSE PRACTITIONER

## 2022-12-02 PROCEDURE — 96372 THER/PROPH/DIAG INJ SC/IM: CPT | Mod: ,,, | Performed by: NURSE PRACTITIONER

## 2022-12-02 PROCEDURE — 1160F RVW MEDS BY RX/DR IN RCRD: CPT | Mod: CPTII,,, | Performed by: NURSE PRACTITIONER

## 2022-12-02 PROCEDURE — 3008F PR BODY MASS INDEX (BMI) DOCUMENTED: ICD-10-PCS | Mod: CPTII,,, | Performed by: NURSE PRACTITIONER

## 2022-12-02 PROCEDURE — 3008F BODY MASS INDEX DOCD: CPT | Mod: CPTII,,, | Performed by: NURSE PRACTITIONER

## 2022-12-02 PROCEDURE — 3078F DIAST BP <80 MM HG: CPT | Mod: CPTII,,, | Performed by: NURSE PRACTITIONER

## 2022-12-02 PROCEDURE — 99214 OFFICE O/P EST MOD 30 MIN: CPT | Mod: 25,,, | Performed by: NURSE PRACTITIONER

## 2022-12-02 PROCEDURE — 3074F PR MOST RECENT SYSTOLIC BLOOD PRESSURE < 130 MM HG: ICD-10-PCS | Mod: CPTII,,, | Performed by: NURSE PRACTITIONER

## 2022-12-02 PROCEDURE — 99214 PR OFFICE/OUTPT VISIT, EST, LEVL IV, 30-39 MIN: ICD-10-PCS | Mod: 25,,, | Performed by: NURSE PRACTITIONER

## 2022-12-02 PROCEDURE — 1159F PR MEDICATION LIST DOCUMENTED IN MEDICAL RECORD: ICD-10-PCS | Mod: CPTII,,, | Performed by: NURSE PRACTITIONER

## 2022-12-02 PROCEDURE — 3074F SYST BP LT 130 MM HG: CPT | Mod: CPTII,,, | Performed by: NURSE PRACTITIONER

## 2022-12-02 PROCEDURE — 3078F PR MOST RECENT DIASTOLIC BLOOD PRESSURE < 80 MM HG: ICD-10-PCS | Mod: CPTII,,, | Performed by: NURSE PRACTITIONER

## 2022-12-02 RX ORDER — DEXAMETHASONE SODIUM PHOSPHATE 4 MG/ML
8 INJECTION, SOLUTION INTRA-ARTICULAR; INTRALESIONAL; INTRAMUSCULAR; INTRAVENOUS; SOFT TISSUE ONCE
Status: COMPLETED | OUTPATIENT
Start: 2022-12-02 | End: 2022-12-02

## 2022-12-02 RX ADMIN — DEXAMETHASONE SODIUM PHOSPHATE 8 MG: 4 INJECTION, SOLUTION INTRA-ARTICULAR; INTRALESIONAL; INTRAMUSCULAR; INTRAVENOUS; SOFT TISSUE at 10:12

## 2022-12-05 DIAGNOSIS — J01.80 ACUTE NON-RECURRENT SINUSITIS OF OTHER SINUS: Primary | ICD-10-CM

## 2022-12-05 RX ORDER — AZITHROMYCIN 250 MG/1
TABLET, FILM COATED ORAL
Qty: 6 TABLET | Refills: 0 | Status: SHIPPED | OUTPATIENT
Start: 2022-12-05 | End: 2022-12-10

## 2022-12-06 ENCOUNTER — TELEPHONE (OUTPATIENT)
Dept: ADMINISTRATIVE | Facility: HOSPITAL | Age: 62
End: 2022-12-06
Payer: COMMERCIAL

## 2022-12-06 NOTE — TELEPHONE ENCOUNTER
----- Message from Shonna Hampton MA sent at 11/30/2022  8:09 AM CST -----  Regarding: Dr KHANG MATTHEW Monday 12-12-22       1. Are there any outstanding Labs, imaging or referrals in the patient's chart?      Wellness Appointment    Fasting wellness labs ordered and ready to do.     Last Wellness  12-6-21             2. . Has the patient been seen in an ER, urgent care clinic, or any other health care    provider since their last visit? If yes when and where?

## 2022-12-12 ENCOUNTER — OFFICE VISIT (OUTPATIENT)
Dept: INTERNAL MEDICINE | Facility: CLINIC | Age: 62
End: 2022-12-12
Payer: COMMERCIAL

## 2022-12-12 ENCOUNTER — APPOINTMENT (OUTPATIENT)
Dept: RADIOLOGY | Facility: HOSPITAL | Age: 62
End: 2022-12-12
Attending: INTERNAL MEDICINE
Payer: COMMERCIAL

## 2022-12-12 VITALS
BODY MASS INDEX: 36.63 KG/M2 | TEMPERATURE: 97 F | HEART RATE: 70 BPM | HEIGHT: 61 IN | OXYGEN SATURATION: 96 % | SYSTOLIC BLOOD PRESSURE: 136 MMHG | WEIGHT: 194 LBS | DIASTOLIC BLOOD PRESSURE: 82 MMHG

## 2022-12-12 DIAGNOSIS — E55.9 VITAMIN D DEFICIENCY: ICD-10-CM

## 2022-12-12 DIAGNOSIS — D05.12 DUCTAL CARCINOMA IN SITU (DCIS) OF LEFT BREAST: ICD-10-CM

## 2022-12-12 DIAGNOSIS — Z00.00 WELLNESS EXAMINATION: Primary | ICD-10-CM

## 2022-12-12 DIAGNOSIS — E03.9 ACQUIRED HYPOTHYROIDISM: ICD-10-CM

## 2022-12-12 DIAGNOSIS — N60.92 ATYPICAL DUCTAL HYPERPLASIA OF LEFT BREAST: ICD-10-CM

## 2022-12-12 DIAGNOSIS — Z91.89 AT HIGH RISK FOR BREAST CANCER: ICD-10-CM

## 2022-12-12 LAB
ALBUMIN SERPL-MCNC: 4.3 GM/DL (ref 3.4–4.8)
ALBUMIN/GLOB SERPL: 1.2 RATIO (ref 1.1–2)
ALP SERPL-CCNC: 108 UNIT/L (ref 40–150)
ALT SERPL-CCNC: 17 UNIT/L (ref 0–55)
AST SERPL-CCNC: 31 UNIT/L (ref 5–34)
BASOPHILS # BLD AUTO: 0.07 X10(3)/MCL (ref 0–0.2)
BASOPHILS NFR BLD AUTO: 0.6 %
BILIRUBIN DIRECT+TOT PNL SERPL-MCNC: 1.6 MG/DL
BUN SERPL-MCNC: 9.9 MG/DL (ref 9.8–20.1)
CALCIUM SERPL-MCNC: 9.9 MG/DL (ref 8.4–10.2)
CHLORIDE SERPL-SCNC: 104 MMOL/L (ref 98–107)
CHOLEST SERPL-MCNC: 227 MG/DL
CHOLEST/HDLC SERPL: 4 {RATIO} (ref 0–5)
CO2 SERPL-SCNC: 27 MMOL/L (ref 23–31)
CREAT SERPL-MCNC: 0.84 MG/DL (ref 0.55–1.02)
CREAT SERPL-MCNC: 0.9 MG/DL (ref 0.5–1.4)
DEPRECATED CALCIDIOL+CALCIFEROL SERPL-MC: 49.3 NG/ML (ref 30–80)
EOSINOPHIL # BLD AUTO: 0.19 X10(3)/MCL (ref 0–0.9)
EOSINOPHIL NFR BLD AUTO: 1.6 %
ERYTHROCYTE [DISTWIDTH] IN BLOOD BY AUTOMATED COUNT: 14.3 % (ref 11.5–17)
EST. AVERAGE GLUCOSE BLD GHB EST-MCNC: 105.4 MG/DL
GFR SERPLBLD CREATININE-BSD FMLA CKD-EPI: >60 MLS/MIN/1.73/M2
GLOBULIN SER-MCNC: 3.6 GM/DL (ref 2.4–3.5)
GLUCOSE SERPL-MCNC: 92 MG/DL (ref 82–115)
HBA1C MFR BLD: 5.3 %
HCT VFR BLD AUTO: 40.3 % (ref 37–47)
HDLC SERPL-MCNC: 56 MG/DL (ref 35–60)
HGB BLD-MCNC: 14 GM/DL (ref 12–16)
IMM GRANULOCYTES # BLD AUTO: 0.07 X10(3)/MCL (ref 0–0.04)
IMM GRANULOCYTES NFR BLD AUTO: 0.6 %
LDLC SERPL CALC-MCNC: 143 MG/DL (ref 50–140)
LYMPHOCYTES # BLD AUTO: 2.9 X10(3)/MCL (ref 0.6–4.6)
LYMPHOCYTES NFR BLD AUTO: 24.3 %
MCH RBC QN AUTO: 28.6 PG (ref 27–31)
MCHC RBC AUTO-ENTMCNC: 34.7 MG/DL (ref 33–36)
MCV RBC AUTO: 82.2 FL (ref 80–94)
MONOCYTES # BLD AUTO: 0.6 X10(3)/MCL (ref 0.1–1.3)
MONOCYTES NFR BLD AUTO: 5 %
NEUTROPHILS # BLD AUTO: 8.1 X10(3)/MCL (ref 2.1–9.2)
NEUTROPHILS NFR BLD AUTO: 67.9 %
NRBC BLD AUTO-RTO: 0 %
PLATELET # BLD AUTO: 352 X10(3)/MCL (ref 130–400)
PMV BLD AUTO: 10.9 FL (ref 7.4–10.4)
POTASSIUM SERPL-SCNC: 4.8 MMOL/L (ref 3.5–5.1)
PROT SERPL-MCNC: 7.9 GM/DL (ref 5.8–7.6)
RBC # BLD AUTO: 4.9 X10(6)/MCL (ref 4.2–5.4)
SAMPLE: NORMAL
SODIUM SERPL-SCNC: 138 MMOL/L (ref 136–145)
T4 FREE SERPL-MCNC: 0.82 NG/DL (ref 0.7–1.48)
TRIGL SERPL-MCNC: 142 MG/DL (ref 37–140)
TSH SERPL-ACNC: 1.46 UIU/ML (ref 0.35–4.94)
VLDLC SERPL CALC-MCNC: 28 MG/DL
WBC # SPEC AUTO: 11.9 X10(3)/MCL (ref 4.5–11.5)

## 2022-12-12 PROCEDURE — 77049 MRI BREAST C-+ W/CAD BI: CPT | Mod: 26,,, | Performed by: RADIOLOGY

## 2022-12-12 PROCEDURE — 99396 PREV VISIT EST AGE 40-64: CPT | Mod: ,,, | Performed by: INTERNAL MEDICINE

## 2022-12-12 PROCEDURE — 1160F RVW MEDS BY RX/DR IN RCRD: CPT | Mod: CPTII,,, | Performed by: INTERNAL MEDICINE

## 2022-12-12 PROCEDURE — 77049 MRI BREAST W/WO CONTRAST, W/CAD, BILATERAL: ICD-10-PCS | Mod: 26,,, | Performed by: RADIOLOGY

## 2022-12-12 PROCEDURE — 3008F PR BODY MASS INDEX (BMI) DOCUMENTED: ICD-10-PCS | Mod: CPTII,,, | Performed by: INTERNAL MEDICINE

## 2022-12-12 PROCEDURE — 36415 COLL VENOUS BLD VENIPUNCTURE: CPT | Performed by: INTERNAL MEDICINE

## 2022-12-12 PROCEDURE — 3079F PR MOST RECENT DIASTOLIC BLOOD PRESSURE 80-89 MM HG: ICD-10-PCS | Mod: CPTII,,, | Performed by: INTERNAL MEDICINE

## 2022-12-12 PROCEDURE — A9577 INJ MULTIHANCE: HCPCS | Performed by: INTERNAL MEDICINE

## 2022-12-12 PROCEDURE — 3008F BODY MASS INDEX DOCD: CPT | Mod: CPTII,,, | Performed by: INTERNAL MEDICINE

## 2022-12-12 PROCEDURE — 36415 COLL VENOUS BLD VENIPUNCTURE: CPT | Mod: ,,, | Performed by: INTERNAL MEDICINE

## 2022-12-12 PROCEDURE — 36415 PR COLLECTION VENOUS BLOOD,VENIPUNCTURE: ICD-10-PCS | Mod: ,,, | Performed by: INTERNAL MEDICINE

## 2022-12-12 PROCEDURE — 99396 PR PREVENTIVE VISIT,EST,40-64: ICD-10-PCS | Mod: ,,, | Performed by: INTERNAL MEDICINE

## 2022-12-12 PROCEDURE — 1159F MED LIST DOCD IN RCRD: CPT | Mod: CPTII,,, | Performed by: INTERNAL MEDICINE

## 2022-12-12 PROCEDURE — 3075F PR MOST RECENT SYSTOLIC BLOOD PRESS GE 130-139MM HG: ICD-10-PCS | Mod: CPTII,,, | Performed by: INTERNAL MEDICINE

## 2022-12-12 PROCEDURE — 1160F PR REVIEW ALL MEDS BY PRESCRIBER/CLIN PHARMACIST DOCUMENTED: ICD-10-PCS | Mod: CPTII,,, | Performed by: INTERNAL MEDICINE

## 2022-12-12 PROCEDURE — 3075F SYST BP GE 130 - 139MM HG: CPT | Mod: CPTII,,, | Performed by: INTERNAL MEDICINE

## 2022-12-12 PROCEDURE — 77049 MRI BREAST C-+ W/CAD BI: CPT | Mod: TC

## 2022-12-12 PROCEDURE — 1159F PR MEDICATION LIST DOCUMENTED IN MEDICAL RECORD: ICD-10-PCS | Mod: CPTII,,, | Performed by: INTERNAL MEDICINE

## 2022-12-12 PROCEDURE — 25500020 PHARM REV CODE 255: Performed by: INTERNAL MEDICINE

## 2022-12-12 PROCEDURE — 3079F DIAST BP 80-89 MM HG: CPT | Mod: CPTII,,, | Performed by: INTERNAL MEDICINE

## 2022-12-12 RX ADMIN — GADOBENATE DIMEGLUMINE 20 ML: 529 INJECTION, SOLUTION INTRAVENOUS at 02:12

## 2022-12-12 NOTE — ASSESSMENT & PLAN NOTE
-labs are drawn in the office today, follow-up on results and keep patient posted   -patient is advised on importance of watching her carbohydrate intake and saturated fat intake, making the right nutritional choices and exercising on a regular basis  -up-to-date with the screening

## 2022-12-12 NOTE — PROGRESS NOTES
Subjective:      Patient ID: Shahnaz Chirinos is a 62 y.o. female.    Chief Complaint: Annual Exam (Wellness)    Ms. Christie is a 62-year-old female here today for  a wellness visit. Medical comorbidities include hypothyroidism, vitamin D deficiency, GERD, DDD and atypical ductal hyperplasia of her left breast for which she is currently on Femara after excision of her breast lesion. Also has obstructive sleep apnea and needs to get her CPAP order completed.    Labs are pending, patient will get them completed today, was drawn in the office.  Overall doing well and has no acute needs or complaints.       Wellness: 2022  MM2022  CRS: 10/2021  DEXA: 2019  Pap: Hysterectomy    The patient's Health Maintenance was reviewed and the following appears to be due at this time:   Health Maintenance Due   Topic Date Due    Shingles Vaccine (1 of 2) Never done        Past Medical History:  Past Medical History:   Diagnosis Date    Abdominal pain     Achilles tendonitis     Acquired hypothyroidism     Acute sinusitis     Cholelithiasis     DCIS (ductal carcinoma in situ)     Gastric reflux     Malaise and fatigue     Osteopenia     Vitamin D deficiency      Past Surgical History:   Procedure Laterality Date     SECTION      EXC. OF BREAST LESION ID BY RADIOLOGICAL Left     KNEE SURGERY      LAPAROSCOPIC CHOLECYSTECTOMY      TOTAL ABDOMINAL HYSTERECTOMY      Per [facility]     Review of patient's allergies indicates:  No Known Allergies  Social History     Socioeconomic History    Marital status:    Tobacco Use    Smoking status: Never    Smokeless tobacco: Never   Substance and Sexual Activity    Alcohol use: Yes    Drug use: Never    Sexual activity: Not Currently     Family History   Problem Relation Age of Onset    Heart failure Mother     COPD Mother     Cancer Father     Diabetes Brother     Peripheral vascular disease Brother     Lung cancer Brother        Review of Systems    A comprehensive  "review of systems was performed and is negative except for that stated above  Objective:   /82 (BP Location: Left arm, Patient Position: Sitting, BP Method: Small (Manual))   Pulse 70   Temp 97.3 °F (36.3 °C) (Temporal)   Ht 5' 1" (1.549 m)   Wt 88 kg (194 lb)   SpO2 96%   BMI 36.66 kg/m²     Physical Exam  Constitutional:       Appearance: Normal appearance.   HENT:      Head: Normocephalic and atraumatic.      Nose: Nose normal.      Mouth/Throat:      Mouth: Mucous membranes are moist.      Pharynx: Oropharynx is clear.   Eyes:      Extraocular Movements: Extraocular movements intact.      Pupils: Pupils are equal, round, and reactive to light.   Cardiovascular:      Rate and Rhythm: Normal rate and regular rhythm.      Pulses: Normal pulses.   Pulmonary:      Effort: Pulmonary effort is normal.      Breath sounds: Normal breath sounds.   Abdominal:      General: Bowel sounds are normal.      Palpations: Abdomen is soft.   Musculoskeletal:         General: Normal range of motion.      Cervical back: Normal range of motion and neck supple.   Skin:     General: Skin is warm.   Neurological:      General: No focal deficit present.      Mental Status: She is alert and oriented to person, place, and time. Mental status is at baseline.   Psychiatric:         Mood and Affect: Mood normal.     Assessment/ Plan:   1. Wellness examination  Assessment & Plan:  -labs are drawn in the office today, follow-up on results and keep patient posted   -patient is advised on importance of watching her carbohydrate intake and saturated fat intake, making the right nutritional choices and exercising on a regular basis  -up-to-date with the screening      2. Ductal carcinoma in situ (DCIS) of left breast  Overview:  Atypical ductal hyperplasia of the left breast--Dx 4/2019        3. Acquired hypothyroidism  Assessment & Plan:  TSH / Free T4 - drawn today  Continue levothyroxine 75 mcg p.o. daily, titrate dose is appropriate " after review of labs  Take medicine on an empty stomach with water (no other medications or beverages). Wait 30 minutes to eat or drink.  Report any symptoms of thinning hair, breaking nails, fatigue, weight gain or loss, palpitations.

## 2022-12-12 NOTE — ASSESSMENT & PLAN NOTE
TSH / Free T4 - drawn today  Continue levothyroxine 75 mcg p.o. daily, titrate dose is appropriate after review of labs  Take medicine on an empty stomach with water (no other medications or beverages). Wait 30 minutes to eat or drink.  Report any symptoms of thinning hair, breaking nails, fatigue, weight gain or loss, palpitations.

## 2022-12-14 ENCOUNTER — OFFICE VISIT (OUTPATIENT)
Dept: HEMATOLOGY/ONCOLOGY | Facility: CLINIC | Age: 62
End: 2022-12-14
Payer: COMMERCIAL

## 2022-12-14 VITALS
HEIGHT: 61 IN | DIASTOLIC BLOOD PRESSURE: 71 MMHG | HEART RATE: 72 BPM | SYSTOLIC BLOOD PRESSURE: 109 MMHG | OXYGEN SATURATION: 99 % | TEMPERATURE: 98 F | WEIGHT: 194 LBS | BODY MASS INDEX: 36.63 KG/M2

## 2022-12-14 DIAGNOSIS — Z91.89 AT HIGH RISK FOR BREAST CANCER: ICD-10-CM

## 2022-12-14 DIAGNOSIS — Z12.31 SCREENING MAMMOGRAM FOR HIGH-RISK PATIENT: Primary | ICD-10-CM

## 2022-12-14 DIAGNOSIS — N60.92 ATYPICAL DUCTAL HYPERPLASIA OF LEFT BREAST: ICD-10-CM

## 2022-12-14 PROBLEM — D05.10 DUCTAL CARCINOMA IN SITU (DCIS) OF BREAST: Status: RESOLVED | Noted: 2022-08-15 | Resolved: 2022-12-14

## 2022-12-14 LAB — T3 SERPL IA-MCNC: 96 NG/DL (ref 80–200)

## 2022-12-14 PROCEDURE — 3074F PR MOST RECENT SYSTOLIC BLOOD PRESSURE < 130 MM HG: ICD-10-PCS | Mod: CPTII,S$GLB,, | Performed by: INTERNAL MEDICINE

## 2022-12-14 PROCEDURE — 1159F MED LIST DOCD IN RCRD: CPT | Mod: CPTII,S$GLB,, | Performed by: INTERNAL MEDICINE

## 2022-12-14 PROCEDURE — 3074F SYST BP LT 130 MM HG: CPT | Mod: CPTII,S$GLB,, | Performed by: INTERNAL MEDICINE

## 2022-12-14 PROCEDURE — 99214 PR OFFICE/OUTPT VISIT, EST, LEVL IV, 30-39 MIN: ICD-10-PCS | Mod: S$GLB,,, | Performed by: INTERNAL MEDICINE

## 2022-12-14 PROCEDURE — 1159F PR MEDICATION LIST DOCUMENTED IN MEDICAL RECORD: ICD-10-PCS | Mod: CPTII,S$GLB,, | Performed by: INTERNAL MEDICINE

## 2022-12-14 PROCEDURE — 3008F PR BODY MASS INDEX (BMI) DOCUMENTED: ICD-10-PCS | Mod: CPTII,S$GLB,, | Performed by: INTERNAL MEDICINE

## 2022-12-14 PROCEDURE — 99214 OFFICE O/P EST MOD 30 MIN: CPT | Mod: S$GLB,,, | Performed by: INTERNAL MEDICINE

## 2022-12-14 PROCEDURE — 3008F BODY MASS INDEX DOCD: CPT | Mod: CPTII,S$GLB,, | Performed by: INTERNAL MEDICINE

## 2022-12-14 PROCEDURE — 3078F PR MOST RECENT DIASTOLIC BLOOD PRESSURE < 80 MM HG: ICD-10-PCS | Mod: CPTII,S$GLB,, | Performed by: INTERNAL MEDICINE

## 2022-12-14 PROCEDURE — 99999 PR PBB SHADOW E&M-EST. PATIENT-LVL IV: ICD-10-PCS | Mod: PBBFAC,,, | Performed by: INTERNAL MEDICINE

## 2022-12-14 PROCEDURE — 3078F DIAST BP <80 MM HG: CPT | Mod: CPTII,S$GLB,, | Performed by: INTERNAL MEDICINE

## 2022-12-14 PROCEDURE — 99999 PR PBB SHADOW E&M-EST. PATIENT-LVL IV: CPT | Mod: PBBFAC,,, | Performed by: INTERNAL MEDICINE

## 2022-12-14 NOTE — PROGRESS NOTES
HEMATOLOGY/ONCOLOGY OFFICE CLINIC VISIT    Visit Information:      Initial Consultation: 7/9/2019  Referring Physician: Dr. Cook  Other Physicians:  Code Status:    Diagnosis/Problem list:   Atypical ductal hyperplasia of the left breast--Dx 4/2019    Present Treatment:  Femara 2.5 mg daily    Treatment history:    Left breast excisional biopsy on 6/14/2019    Plan of care: Endocrine chemoprophylaxis therapy x 5 yrs      Imaging:    Screening mammogram 4/18/2019 was read as BI-RADS Category 0  Diagnostic Sammy left mammogram and ultrasound 5/13/2019, left breast 9 o'clock position 3 cm from the nipple mass and suspicious.  BI-RADS Category 4. Two additional masses in the left breast seen sonographically favored to represent complicated cysts.  BI-RADS Category 3: Probably benign.  Left breast 12 o'clock position 3 cm from the nipple complicated cyst, benign, BI-RADS Category 2.  Bone density 7/11/2019-normal  MRI breast 11/27/19: Post surgical change of the 9:00 left breast related to recent excisional biopsy.  Multiple benign cysts throughout both breasts. MRI BI-RADS: 2 Benign  BILATERAL DIGITAL DIAGNOSTIC MAMMOGRAM 3D/2D WITH CAD AND TARGETED LEFT ULTRASOUND: 2/11/2020  IMPRESSION: BENIGN, ULTRASOUND PROBABLY BENIGN   1) Left breast 9:00 axis periareolar position fat necrosis at the site of prior surgical excisional biopsy correlates with the patient's area of palpable concern and is benign. BI-RADS 2: benign.  2) Left breast 11:00 axis 3 cm FN mass is stable over 9 months of sonographic follow-up and is still considered probably benign, most likely a complicated cyst. BI-RADS 3: probably benign.   3) Left breast 10:00 axis 4 cm FN simple cyst is benign. BI-RADS 2: benign.  4) No mammographic evidence of malignancy in either breast.  RECOMMENDATIONS:  Recommend continued follow-up of the left breast 11:00 axis 3 cm FN mass per the standard BI-RADS 3 protocol.    US left breast 5/14/2020: No sonographic  evidence of malignancy is identified in the 11:00 left breast. Simple cyst in the 11:00 left breast is benign. Ultrasound BI-RADS: 2 Benign   MRI of the breast 11/17/2020 with no suspicious enhancement in either breast to suggest malignancy.  Routine screening mammography in February 2021 is recommended with additional consideration of continue on a supplement of breast cancer screening with dynamic contrast-enhanced breast MRI ideally 6 months following mammogram in this high risk patient with a lifetime risk of 35.1%.      MRI of the breast 12/12/2022:     Pathology:    CLINICAL HISTORY:       Patient: 61 y/o female kindly referred by Dr. Cook for atypical ductal hyperplasia. Screening mammogram performed on 4/18/2019 was read as BI-RADS Category 0 further imaging left diagnostic mammography with tomosynthesis was recommended to evaluate annual asymmetry in the left breast posterior depth central to the nipple on the cranial caudal view.  On 5/13/2019, diagnostic Sammy left mammogram and ultrasound show a left breast 9 o'clock position 3 cm from the nipple mass and suspicious.  BI-RADS Category 4.  2 additional masses in the left breast seen sonographically favored to represent complicated cysts.  BI-RADS Category 3: Probably benign.  Left breast 12 o'clock position 3 cm from the nipple complicated cyst, benign, BI-RADS Category 2.  Ultrasound-guided biopsy of the left breast at the 9 o'clock position was performed on 5/24/2019.  Pathology report revealed a typical papilloma, papilloma with atypical ductal hyperplasia).  An intracystic papillary carcinoma/ductal carcinoma in situ cannot be rule out.  Surgical consultation was recommended.    Left breast excisional biopsy on 6/14/2019 showed focal residual a typical papilloma.  No evidence of invasive carcinoma.  Patient was then referred for chemoprophylaxis.    Patient does not have history of breast or ovarian cancer.  There is lung and throat cancer in her  "family.  She took birth control pills of unknown when younger.  Partial hysterectomy and unilateral oophorectomy in her mid 30s.      Chief Complaint: No Concerns today      Interval History:  Patient is here today in follow-up of atypical ductal hyperplasia. She has no complaints today.  She is taking letrozole and tolerating well.   She had annual screening mammogram on 2/15/22 that was benign. She had  MRI of the breast on Monday, 12/12/22, report pending. She is doing well, no fever, chills or sweats. No chest pain or shortness of breath. No bleeding.  She reports no changes in monthly SBE.    ROS:  All 14 points ROS taken and positive as per Interval History, all other negative.    Histories:  PMH/PSH/FH/SOCIAL/ALLERGIES AND MEDS REVIEWED AND UPDATED AS APPROPRIATE       Vitals:    12/14/22 1517   BP: 109/71   BP Location: Left arm   Patient Position: Sitting   Pulse: 72   Temp: 97.9 °F (36.6 °C)   TempSrc: Oral   SpO2: 99%   Weight: 88 kg (194 lb)   Height: 5' 1" (1.549 m)      Physical Exam  ECOG SCORE    0 - Fully active-able to carry on all pre-disease performance without restriction         Laboratory:  CBC with Differential:  Lab Results   Component Value Date    WBC 11.9 (H) 12/12/2022    RBC 4.90 12/12/2022    HGB 14.0 12/12/2022    HCT 40.3 12/12/2022    MCV 82.2 12/12/2022    MCH 28.6 12/12/2022    MCHC 34.7 12/12/2022    RDW 14.3 12/12/2022     12/12/2022    MPV 10.9 (H) 12/12/2022        CMP:  Sodium Level   Date Value Ref Range Status   12/12/2022 138 136 - 145 mmol/L Final     Potassium Level   Date Value Ref Range Status   12/12/2022 4.8 3.5 - 5.1 mmol/L Final     Carbon Dioxide   Date Value Ref Range Status   12/12/2022 27 23 - 31 mmol/L Final     Blood Urea Nitrogen   Date Value Ref Range Status   12/12/2022 9.9 9.8 - 20.1 mg/dL Final     Creatinine   Date Value Ref Range Status   12/12/2022 0.84 0.55 - 1.02 mg/dL Final     Calcium Level Total   Date Value Ref Range Status   12/12/2022 " 9.9 8.4 - 10.2 mg/dL Final     Albumin Level   Date Value Ref Range Status   12/12/2022 4.3 3.4 - 4.8 gm/dL Final     Bilirubin Total   Date Value Ref Range Status   12/12/2022 1.6 (H) <=1.5 mg/dL Final     Alkaline Phosphatase   Date Value Ref Range Status   12/12/2022 108 40 - 150 unit/L Final     Aspartate Aminotransferase   Date Value Ref Range Status   12/12/2022 31 5 - 34 unit/L Final     Alanine Aminotransferase   Date Value Ref Range Status   12/12/2022 17 0 - 55 unit/L Final     Estimated GFR-Non    Date Value Ref Range Status   02/10/2022 >60           Assessment:       1. Screening mammogram for high-risk patient    2. Atypical ductal hyperplasia of left breast    3. At high risk for breast cancer      1) Atypical ductal hyperplasia of the left breast-- marker or risk factor for developing breast cancer in the future. Women with atypical hyperplasia have a lifetime risk of breast cancer that is about four times higher than that of women who don't have atypical hyperplasia: At 5 years after diagnosis, about 7 percent of women with atypical hyperplasia may develop breast cancer.  At 10 years after diagnosis, about 13 percent of women with atypical hyperplasia may develop breast cancer. At 25 years after diagnosis, about 30 percent of women with atypical hyperplasia may develop breast cancer.-- Treatment with medications such as tamoxifen or raloxifene (Evista), or aromatase inhibitors, such as exemestane (Aromasin) and anastrozole (Arimidex) for five years may reduce the risk of breast cancer.  Discussed risks vs benefits as well as toxicities associated with these medications. She was started on Aromasin--too expensive, now on Femara    Patient with an elevated lifetime risk of developing breast cancer (35.1% according to the Tyrer-Cuzick model). Will cont mammograms alternating with Breast MRI's  2) Hot flashes due to AI's  3) Appointment with Dr. Caputo for genetic testing--did not meet  criteria for testing  4) Bone density 7/11/2019 was normal it will be repeated q 2 yrs.  If osteopenia or osteoporosis then she will be started on Prolia      Plan:       Patient doing well.east exam benign. MRI results pending. Will continue surveillance for high risk patient.   Goal will be MRI q year with mammogram q year at 6 MONTHS INTERVAL    MRI breast done 12/12/22 pending, will call if abnormal results  Cont Femara for chemoprophylaxis.   RTC in 2 months after MMG with CBC, CMP  Screening Mammogram due 2/23--ordered  Next MRI breast is due 8/2023--will order next visit  Encouraged to call for any questions or problems  The patient was given ample opportunity to ask questions and they were all answered to satisfaction; patient demonstrated understanding of what we discussed and is agreeable to the plan.    ALISON POWERS MD      Professional Services   I, Svetlana Rogers LPN, acted solely as a scribe for and in the presence of Dr. Alison Powers, who performed these services.

## 2023-02-20 ENCOUNTER — HOSPITAL ENCOUNTER (OUTPATIENT)
Dept: RADIOLOGY | Facility: HOSPITAL | Age: 63
Discharge: HOME | End: 2023-02-20
Attending: INTERNAL MEDICINE
Payer: COMMERCIAL

## 2023-02-20 DIAGNOSIS — Z12.31 SCREENING MAMMOGRAM FOR HIGH-RISK PATIENT: ICD-10-CM

## 2023-02-20 DIAGNOSIS — N60.92 ATYPICAL DUCTAL HYPERPLASIA OF LEFT BREAST: ICD-10-CM

## 2023-02-20 DIAGNOSIS — Z91.89 AT HIGH RISK FOR BREAST CANCER: ICD-10-CM

## 2023-02-20 PROCEDURE — 77063 BREAST TOMOSYNTHESIS BI: CPT | Mod: 26,,, | Performed by: RADIOLOGY

## 2023-02-20 PROCEDURE — 77067 SCR MAMMO BI INCL CAD: CPT | Mod: TC

## 2023-02-20 PROCEDURE — 77067 SCR MAMMO BI INCL CAD: CPT | Mod: 26,,, | Performed by: RADIOLOGY

## 2023-02-20 PROCEDURE — 77063 MAMMO DIGITAL SCREENING BILAT WITH TOMO: ICD-10-PCS | Mod: 26,,, | Performed by: RADIOLOGY

## 2023-02-20 PROCEDURE — 77067 MAMMO DIGITAL SCREENING BILAT WITH TOMO: ICD-10-PCS | Mod: 26,,, | Performed by: RADIOLOGY

## 2023-02-27 ENCOUNTER — OFFICE VISIT (OUTPATIENT)
Dept: HEMATOLOGY/ONCOLOGY | Facility: CLINIC | Age: 63
End: 2023-02-27
Payer: COMMERCIAL

## 2023-02-27 ENCOUNTER — LAB VISIT (OUTPATIENT)
Dept: LAB | Facility: HOSPITAL | Age: 63
End: 2023-02-27
Attending: INTERNAL MEDICINE
Payer: COMMERCIAL

## 2023-02-27 VITALS
SYSTOLIC BLOOD PRESSURE: 153 MMHG | HEART RATE: 71 BPM | WEIGHT: 195 LBS | TEMPERATURE: 99 F | OXYGEN SATURATION: 99 % | BODY MASS INDEX: 38.28 KG/M2 | HEIGHT: 60 IN | DIASTOLIC BLOOD PRESSURE: 93 MMHG

## 2023-02-27 DIAGNOSIS — Z91.89 AT HIGH RISK FOR BREAST CANCER: ICD-10-CM

## 2023-02-27 DIAGNOSIS — Z12.31 SCREENING MAMMOGRAM FOR HIGH-RISK PATIENT: ICD-10-CM

## 2023-02-27 DIAGNOSIS — Z91.89 AT HIGH RISK FOR BREAST CANCER: Primary | ICD-10-CM

## 2023-02-27 DIAGNOSIS — Z79.811 USE OF LETROZOLE (FEMARA): ICD-10-CM

## 2023-02-27 DIAGNOSIS — N60.92 ATYPICAL DUCTAL HYPERPLASIA OF LEFT BREAST: ICD-10-CM

## 2023-02-27 LAB
ALBUMIN SERPL-MCNC: 4 G/DL (ref 3.4–4.8)
ALBUMIN/GLOB SERPL: 1.3 RATIO (ref 1.1–2)
ALP SERPL-CCNC: 104 UNIT/L (ref 40–150)
ALT SERPL-CCNC: 15 UNIT/L (ref 0–55)
AST SERPL-CCNC: 24 UNIT/L (ref 5–34)
BASOPHILS # BLD AUTO: 0.07 X10(3)/MCL (ref 0–0.2)
BASOPHILS NFR BLD AUTO: 0.7 %
BILIRUBIN DIRECT+TOT PNL SERPL-MCNC: 1.5 MG/DL
BUN SERPL-MCNC: 8.9 MG/DL (ref 9.8–20.1)
CALCIUM SERPL-MCNC: 9.4 MG/DL (ref 8.4–10.2)
CHLORIDE SERPL-SCNC: 107 MMOL/L (ref 98–107)
CO2 SERPL-SCNC: 27 MMOL/L (ref 23–31)
CREAT SERPL-MCNC: 0.99 MG/DL (ref 0.55–1.02)
EOSINOPHIL # BLD AUTO: 0.16 X10(3)/MCL (ref 0–0.9)
EOSINOPHIL NFR BLD AUTO: 1.7 %
ERYTHROCYTE [DISTWIDTH] IN BLOOD BY AUTOMATED COUNT: 13.1 % (ref 11.5–17)
GFR SERPLBLD CREATININE-BSD FMLA CKD-EPI: >60 MLS/MIN/1.73/M2
GLOBULIN SER-MCNC: 3 GM/DL (ref 2.4–3.5)
GLUCOSE SERPL-MCNC: 79 MG/DL (ref 82–115)
HCT VFR BLD AUTO: 39.2 % (ref 37–47)
HGB BLD-MCNC: 13.4 G/DL (ref 12–16)
IMM GRANULOCYTES # BLD AUTO: 0.02 X10(3)/MCL (ref 0–0.04)
IMM GRANULOCYTES NFR BLD AUTO: 0.2 %
LYMPHOCYTES # BLD AUTO: 2.83 X10(3)/MCL (ref 0.6–4.6)
LYMPHOCYTES NFR BLD AUTO: 29.3 %
MCH RBC QN AUTO: 28.4 PG
MCHC RBC AUTO-ENTMCNC: 34.2 G/DL (ref 33–36)
MCV RBC AUTO: 83.1 FL (ref 80–94)
MONOCYTES # BLD AUTO: 0.57 X10(3)/MCL (ref 0.1–1.3)
MONOCYTES NFR BLD AUTO: 5.9 %
NEUTROPHILS # BLD AUTO: 6.02 X10(3)/MCL (ref 2.1–9.2)
NEUTROPHILS NFR BLD AUTO: 62.2 %
PLATELET # BLD AUTO: 319 X10(3)/MCL (ref 130–400)
PMV BLD AUTO: 9.7 FL (ref 7.4–10.4)
POTASSIUM SERPL-SCNC: 4 MMOL/L (ref 3.5–5.1)
PROT SERPL-MCNC: 7 GM/DL (ref 5.8–7.6)
RBC # BLD AUTO: 4.72 X10(6)/MCL (ref 4.2–5.4)
SODIUM SERPL-SCNC: 140 MMOL/L (ref 136–145)
WBC # SPEC AUTO: 9.7 X10(3)/MCL (ref 4.5–11.5)

## 2023-02-27 PROCEDURE — 99999 PR PBB SHADOW E&M-EST. PATIENT-LVL III: ICD-10-PCS | Mod: PBBFAC,,, | Performed by: INTERNAL MEDICINE

## 2023-02-27 PROCEDURE — 3077F PR MOST RECENT SYSTOLIC BLOOD PRESSURE >= 140 MM HG: ICD-10-PCS | Mod: CPTII,S$GLB,, | Performed by: INTERNAL MEDICINE

## 2023-02-27 PROCEDURE — 80053 COMPREHEN METABOLIC PANEL: CPT

## 2023-02-27 PROCEDURE — 99999 PR PBB SHADOW E&M-EST. PATIENT-LVL III: CPT | Mod: PBBFAC,,, | Performed by: INTERNAL MEDICINE

## 2023-02-27 PROCEDURE — 3080F DIAST BP >= 90 MM HG: CPT | Mod: CPTII,S$GLB,, | Performed by: INTERNAL MEDICINE

## 2023-02-27 PROCEDURE — 1159F PR MEDICATION LIST DOCUMENTED IN MEDICAL RECORD: ICD-10-PCS | Mod: CPTII,S$GLB,, | Performed by: INTERNAL MEDICINE

## 2023-02-27 PROCEDURE — 99214 PR OFFICE/OUTPT VISIT, EST, LEVL IV, 30-39 MIN: ICD-10-PCS | Mod: S$GLB,,, | Performed by: INTERNAL MEDICINE

## 2023-02-27 PROCEDURE — 1159F MED LIST DOCD IN RCRD: CPT | Mod: CPTII,S$GLB,, | Performed by: INTERNAL MEDICINE

## 2023-02-27 PROCEDURE — 3080F PR MOST RECENT DIASTOLIC BLOOD PRESSURE >= 90 MM HG: ICD-10-PCS | Mod: CPTII,S$GLB,, | Performed by: INTERNAL MEDICINE

## 2023-02-27 PROCEDURE — 3008F PR BODY MASS INDEX (BMI) DOCUMENTED: ICD-10-PCS | Mod: CPTII,S$GLB,, | Performed by: INTERNAL MEDICINE

## 2023-02-27 PROCEDURE — 1160F PR REVIEW ALL MEDS BY PRESCRIBER/CLIN PHARMACIST DOCUMENTED: ICD-10-PCS | Mod: CPTII,S$GLB,, | Performed by: INTERNAL MEDICINE

## 2023-02-27 PROCEDURE — 3077F SYST BP >= 140 MM HG: CPT | Mod: CPTII,S$GLB,, | Performed by: INTERNAL MEDICINE

## 2023-02-27 PROCEDURE — 3008F BODY MASS INDEX DOCD: CPT | Mod: CPTII,S$GLB,, | Performed by: INTERNAL MEDICINE

## 2023-02-27 PROCEDURE — 36415 COLL VENOUS BLD VENIPUNCTURE: CPT

## 2023-02-27 PROCEDURE — 99214 OFFICE O/P EST MOD 30 MIN: CPT | Mod: S$GLB,,, | Performed by: INTERNAL MEDICINE

## 2023-02-27 PROCEDURE — 85025 COMPLETE CBC W/AUTO DIFF WBC: CPT

## 2023-02-27 PROCEDURE — 1160F RVW MEDS BY RX/DR IN RCRD: CPT | Mod: CPTII,S$GLB,, | Performed by: INTERNAL MEDICINE

## 2023-02-27 NOTE — PROGRESS NOTES
HEMATOLOGY/ONCOLOGY OFFICE CLINIC VISIT    Visit Information:      Initial Consultation: 7/9/2019  Referring Physician: Dr. Cook  Other Physicians:  Code Status:    Diagnosis/Problem list:   Atypical ductal hyperplasia of the left breast--Dx 4/2019    Present Treatment:  Femara 2.5 mg daily    Treatment history:    Left breast excisional biopsy on 6/14/2019    Plan of care: Endocrine chemoprophylaxis therapy x 5 yrs      Imaging:  Screening mammogram 4/18/2019 was read as BI-RADS Category 0  Diagnostic Sammy left mammogram and ultrasound 5/13/2019, left breast 9 o'clock position 3 cm from the nipple mass and suspicious.  BI-RADS Category 4. Two additional masses in the left breast seen sonographically favored to represent complicated cysts.  BI-RADS Category 3: Probably benign.  Left breast 12 o'clock position 3 cm from the nipple complicated cyst, benign, BI-RADS Category 2.  Bone density 7/11/2019-normal  MRI breast 11/27/19: Post surgical change of the 9:00 left breast related to recent excisional biopsy.  Multiple benign cysts throughout both breasts. MRI BI-RADS: 2 Benign  BILATERAL DIGITAL DIAGNOSTIC MAMMOGRAM 3D/2D WITH CAD AND TARGETED LEFT ULTRASOUND: 2/11/2020  IMPRESSION: BENIGN, ULTRASOUND PROBABLY BENIGN   1) Left breast 9:00 axis periareolar position fat necrosis at the site of prior surgical excisional biopsy correlates with the patient's area of palpable concern and is benign. BI-RADS 2: benign.  2) Left breast 11:00 axis 3 cm FN mass is stable over 9 months of sonographic follow-up and is still considered probably benign, most likely a complicated cyst. BI-RADS 3: probably benign.   3) Left breast 10:00 axis 4 cm FN simple cyst is benign. BI-RADS 2: benign.  4) No mammographic evidence of malignancy in either breast.  RECOMMENDATIONS:  Recommend continued follow-up of the left breast 11:00 axis 3 cm FN mass per the standard BI-RADS 3 protocol.    US left breast 5/14/2020: No sonographic evidence  of malignancy is identified in the 11:00 left breast. Simple cyst in the 11:00 left breast is benign. Ultrasound BI-RADS: 2 Benign   MRI of the breast 11/17/2020 with no suspicious enhancement in either breast to suggest malignancy.  Routine screening mammography in February 2021 is recommended with additional consideration of continue on a supplement of breast cancer screening with dynamic contrast-enhanced breast MRI ideally 6 months following mammogram in this high risk patient with a lifetime risk of 35.1%.  MRI of the breast 12/12/2022:   MMG 2/23/2023: -RADS: 2 Benign. Continued annual mammography and supplemental breast screening with dynamic contrast enhanced breast MRI is suggested for this high-risk patient, preferably alternating mammography and MRI every 6 months.  - Routine annual screening mammography is due in 2 months unless clinical signs or symptoms warrant earlier evaluation (February 2023).    Pathology:    CLINICAL HISTORY:       Patient: 63 y/o female kindly referred by Dr. Cook for atypical ductal hyperplasia. Screening mammogram performed on 4/18/2019 was read as BI-RADS Category 0 further imaging left diagnostic mammography with tomosynthesis was recommended to evaluate annual asymmetry in the left breast posterior depth central to the nipple on the cranial caudal view.  On 5/13/2019, diagnostic Sammy left mammogram and ultrasound show a left breast 9 o'clock position 3 cm from the nipple mass and suspicious.  BI-RADS Category 4.  2 additional masses in the left breast seen sonographically favored to represent complicated cysts.  BI-RADS Category 3: Probably benign.  Left breast 12 o'clock position 3 cm from the nipple complicated cyst, benign, BI-RADS Category 2.  Ultrasound-guided biopsy of the left breast at the 9 o'clock position was performed on 5/24/2019.  Pathology report revealed a typical papilloma, papilloma with atypical ductal hyperplasia).  An intracystic papillary  carcinoma/ductal carcinoma in situ cannot be rule out.  Surgical consultation was recommended.    Left breast excisional biopsy on 6/14/2019 showed focal residual a typical papilloma.  No evidence of invasive carcinoma.  Patient was then referred for chemoprophylaxis.    Patient does not have history of breast or ovarian cancer.  There is lung and throat cancer in her family.  She took birth control pills of unknown when younger.  Partial hysterectomy and unilateral oophorectomy in her mid 30s.      Chief Complaint: No Concerns today      Interval History:  Patient is here today in follow-up of atypical ductal hyperplasia. She has no complaints today.  She is taking letrozole and tolerating well.   She had annual screening mammogram on 2/23/23 that was benign. She is doing well, no fever, chills or sweats. No chest pain or shortness of breath. No bleeding. She reports no changes in monthly SBE.    ROS:All 14 points ROS taken and positive as per Interval History, all other negative.  Review of Systems   Constitutional:  Negative for chills, fever and weight loss.   HENT:  Negative for congestion and nosebleeds.    Eyes:  Negative for blurred vision, double vision and photophobia.   Respiratory:  Negative for cough, hemoptysis and shortness of breath.    Cardiovascular:  Negative for chest pain, palpitations, leg swelling and PND.   Gastrointestinal:  Negative for abdominal pain, blood in stool, constipation, diarrhea, melena, nausea and vomiting.   Genitourinary:  Negative for dysuria, frequency, hematuria and urgency.   Musculoskeletal:  Negative for back pain, falls and myalgias.   Skin:  Negative for itching and rash.   Neurological:  Negative for tremors, focal weakness, seizures, weakness and headaches.   Endo/Heme/Allergies:  Negative for environmental allergies. Does not bruise/bleed easily.   Psychiatric/Behavioral:  Negative for depression and suicidal ideas. The patient is not nervous/anxious.         Histories:  PMH/PSH/FH/SOCIAL/ALLERGIES AND MEDS REVIEWED AND UPDATED AS APPROPRIATE       Vitals:    02/27/23 1114   BP: (!) 153/93   BP Location: Right arm   Patient Position: Sitting   Pulse: 71   Temp: 98.5 °F (36.9 °C)   TempSrc: Oral   SpO2: 99%   Weight: 88.5 kg (195 lb)   Height: 5' (1.524 m)        Physical Exam  Constitutional:       Appearance: Normal appearance.   HENT:      Head: Normocephalic and atraumatic.   Eyes:      General: No scleral icterus.     Extraocular Movements: Extraocular movements intact.      Conjunctiva/sclera: Conjunctivae normal.   Neck:      Vascular: No JVD.   Cardiovascular:      Rate and Rhythm: Normal rate and regular rhythm.      Heart sounds: No murmur heard.  Pulmonary:      Effort: Pulmonary effort is normal.      Breath sounds: Normal breath sounds. No wheezing or rhonchi.   Chest:   Breasts:     Right: Normal. No swelling, mass, nipple discharge, skin change or tenderness.      Left: Normal. No swelling, mass, nipple discharge, skin change or tenderness.   Abdominal:      General: Bowel sounds are normal. There is no distension.      Palpations: Abdomen is soft. There is no mass.      Tenderness: There is no abdominal tenderness.   Musculoskeletal:      Cervical back: Neck supple.   Lymphadenopathy:      Head:      Right side of head: No submandibular adenopathy.      Left side of head: No submandibular adenopathy.      Cervical: No cervical adenopathy.      Upper Body:      Right upper body: No supraclavicular or axillary adenopathy.      Left upper body: No supraclavicular or axillary adenopathy.      Lower Body: No right inguinal adenopathy. No left inguinal adenopathy.   Skin:     General: Skin is warm.      Coloration: Skin is not jaundiced.      Findings: No lesion or rash.      Nails: There is no clubbing.   Neurological:      Mental Status: She is alert and oriented to person, place, and time.      Cranial Nerves: Cranial nerves 2-12 are intact.    Psychiatric:         Attention and Perception: Attention normal.         Behavior: Behavior is cooperative.         Judgment: Judgment normal.     ECOG SCORE             Laboratory:  CBC with Differential:  Lab Results   Component Value Date    WBC 9.7 02/27/2023    RBC 4.72 02/27/2023    HGB 13.4 02/27/2023    HCT 39.2 02/27/2023    MCV 83.1 02/27/2023    MCH 28.4 02/27/2023    MCHC 34.2 02/27/2023    RDW 13.1 02/27/2023     02/27/2023    MPV 9.7 02/27/2023        CMP:  Sodium Level   Date Value Ref Range Status   02/27/2023 140 136 - 145 mmol/L Final     Potassium Level   Date Value Ref Range Status   02/27/2023 4.0 3.5 - 5.1 mmol/L Final     Carbon Dioxide   Date Value Ref Range Status   02/27/2023 27 23 - 31 mmol/L Final     Blood Urea Nitrogen   Date Value Ref Range Status   02/27/2023 8.9 (L) 9.8 - 20.1 mg/dL Final     Creatinine   Date Value Ref Range Status   02/27/2023 0.99 0.55 - 1.02 mg/dL Final     Calcium Level Total   Date Value Ref Range Status   02/27/2023 9.4 8.4 - 10.2 mg/dL Final     Albumin Level   Date Value Ref Range Status   02/27/2023 4.0 3.4 - 4.8 g/dL Final     Bilirubin Total   Date Value Ref Range Status   02/27/2023 1.5 <=1.5 mg/dL Final     Alkaline Phosphatase   Date Value Ref Range Status   02/27/2023 104 40 - 150 unit/L Final     Aspartate Aminotransferase   Date Value Ref Range Status   02/27/2023 24 5 - 34 unit/L Final     Alanine Aminotransferase   Date Value Ref Range Status   02/27/2023 15 0 - 55 unit/L Final     Estimated GFR-Non    Date Value Ref Range Status   02/10/2022 >60           Assessment:       1. At high risk for breast cancer    2. Atypical ductal hyperplasia of left breast    3. Use of letrozole (Femara)        1) Atypical ductal hyperplasia of the left breast-- marker or risk factor for developing breast cancer in the future. Women with atypical hyperplasia have a lifetime risk of breast cancer that is about four times higher than that of  women who don't have atypical hyperplasia: At 5 years after diagnosis, about 7 percent of women with atypical hyperplasia may develop breast cancer.  At 10 years after diagnosis, about 13 percent of women with atypical hyperplasia may develop breast cancer. At 25 years after diagnosis, about 30 percent of women with atypical hyperplasia may develop breast cancer.-- Treatment with medications such as tamoxifen or raloxifene (Evista), or aromatase inhibitors, such as exemestane (Aromasin) and anastrozole (Arimidex) for five years may reduce the risk of breast cancer.  Discussed risks vs benefits as well as toxicities associated with these medications. She was started on Aromasin--too expensive, now on Femara  Patient with an elevated lifetime risk of developing breast cancer (35.1% according to the Tyrer-Cuzick model). Will cont mammograms alternating with Breast MRI's  2) Hot flashes due to AI's  3) Appointment with Dr. Caputo for genetic testing--did not meet criteria for testing  4) Bone density 7/11/2019 was normal it will be repeated q 2 yrs.  If osteopenia or osteoporosis then she will be started on Prolia      Plan:       Patient doing well.east exam benign. MRI results pending. Will continue surveillance for high risk patient.   Goal will be MRI q year with mammogram q year at 6 MONTHS INTERVAL      Cont Femara for chemoprophylaxis.   RTC in 6 months with NP after MRI breast  with CBC, CMP same day  Screening Mammogram due 2/24--will order next visit  Next MRI breast is due 8/2023--ordered  Bone density due now-ordered  Encouraged to call for any questions or problems  The patient was given ample opportunity to ask questions and they were all answered to satisfaction; patient demonstrated understanding of what we discussed and is agreeable to the plan.    ALISON POWERS MD

## 2023-02-28 ENCOUNTER — TELEPHONE (OUTPATIENT)
Dept: HEMATOLOGY/ONCOLOGY | Facility: CLINIC | Age: 63
End: 2023-02-28
Payer: COMMERCIAL

## 2023-03-23 ENCOUNTER — TELEPHONE (OUTPATIENT)
Dept: INTERNAL MEDICINE | Facility: CLINIC | Age: 63
End: 2023-03-23
Payer: COMMERCIAL

## 2023-03-23 ENCOUNTER — TELEPHONE (OUTPATIENT)
Dept: ADMINISTRATIVE | Facility: HOSPITAL | Age: 63
End: 2023-03-23
Payer: COMMERCIAL

## 2023-03-23 NOTE — TELEPHONE ENCOUNTER
----- Message from Antonia Salazar sent at 3/23/2023  8:41 AM CDT -----  Regarding: Lab  Pt called and stated if there anyway she can take a CMP or whatever it is you check for liver and kidney function     Please advise     [phone] option 3 call her at work

## 2023-03-23 NOTE — TELEPHONE ENCOUNTER
Please inform patient she just had this checked less than a month ago with her metabolic panel indicating her kidney and liver were essentially normal.  No need to repeat from our standpoint

## 2023-05-09 ENCOUNTER — PATIENT MESSAGE (OUTPATIENT)
Dept: RESEARCH | Facility: HOSPITAL | Age: 63
End: 2023-05-09
Payer: COMMERCIAL

## 2023-05-15 DIAGNOSIS — E03.9 ACQUIRED HYPOTHYROIDISM: Primary | ICD-10-CM

## 2023-06-07 ENCOUNTER — TELEPHONE (OUTPATIENT)
Dept: ADMINISTRATIVE | Facility: HOSPITAL | Age: 63
End: 2023-06-07
Payer: COMMERCIAL

## 2023-06-07 NOTE — TELEPHONE ENCOUNTER
----- Message from Shonna Hampton MA sent at 5/15/2023  7:47 AM CDT -----  Regarding: Dr KHANG MATTHEW Wednesday 6-14-23       1. Are there any outstanding Labs, imaging or referrals in the patient's chart?      6 month follow up for thyroid    Non fasting lab ordered and ready to do    Last wellness 12-12-22           2. . Has the patient been seen in an ER, urgent care clinic, or any other health care    provider since their last visit? If yes when and where?

## 2023-06-08 ENCOUNTER — LAB VISIT (OUTPATIENT)
Dept: LAB | Facility: HOSPITAL | Age: 63
End: 2023-06-08
Attending: INTERNAL MEDICINE
Payer: COMMERCIAL

## 2023-06-08 DIAGNOSIS — Z91.89 AT HIGH RISK FOR BREAST CANCER: ICD-10-CM

## 2023-06-08 DIAGNOSIS — N60.92 ATYPICAL DUCTAL HYPERPLASIA OF LEFT BREAST: ICD-10-CM

## 2023-06-08 DIAGNOSIS — E03.9 ACQUIRED HYPOTHYROIDISM: ICD-10-CM

## 2023-06-08 DIAGNOSIS — Z79.811 USE OF LETROZOLE (FEMARA): ICD-10-CM

## 2023-06-08 LAB
ALBUMIN SERPL-MCNC: 3.9 G/DL (ref 3.4–4.8)
ALBUMIN/GLOB SERPL: 1.3 RATIO (ref 1.1–2)
ALP SERPL-CCNC: 114 UNIT/L (ref 40–150)
ALT SERPL-CCNC: 13 UNIT/L (ref 0–55)
AST SERPL-CCNC: 20 UNIT/L (ref 5–34)
BASOPHILS # BLD AUTO: 0.09 X10(3)/MCL
BASOPHILS NFR BLD AUTO: 0.9 %
BILIRUBIN DIRECT+TOT PNL SERPL-MCNC: 0.8 MG/DL
BUN SERPL-MCNC: 14.5 MG/DL (ref 9.8–20.1)
CALCIUM SERPL-MCNC: 9.3 MG/DL (ref 8.4–10.2)
CHLORIDE SERPL-SCNC: 104 MMOL/L (ref 98–107)
CO2 SERPL-SCNC: 29 MMOL/L (ref 23–31)
CREAT SERPL-MCNC: 0.9 MG/DL (ref 0.55–1.02)
EOSINOPHIL # BLD AUTO: 0.21 X10(3)/MCL (ref 0–0.9)
EOSINOPHIL NFR BLD AUTO: 2.2 %
ERYTHROCYTE [DISTWIDTH] IN BLOOD BY AUTOMATED COUNT: 14 % (ref 11.5–17)
GFR SERPLBLD CREATININE-BSD FMLA CKD-EPI: >60 MLS/MIN/1.73/M2
GLOBULIN SER-MCNC: 3 GM/DL (ref 2.4–3.5)
GLUCOSE SERPL-MCNC: 101 MG/DL (ref 82–115)
HCT VFR BLD AUTO: 38.9 % (ref 37–47)
HGB BLD-MCNC: 13.6 G/DL (ref 12–16)
IMM GRANULOCYTES # BLD AUTO: 0.02 X10(3)/MCL (ref 0–0.04)
IMM GRANULOCYTES NFR BLD AUTO: 0.2 %
LYMPHOCYTES # BLD AUTO: 2.68 X10(3)/MCL (ref 0.6–4.6)
LYMPHOCYTES NFR BLD AUTO: 27.6 %
MCH RBC QN AUTO: 28.8 PG (ref 27–31)
MCHC RBC AUTO-ENTMCNC: 35 G/DL (ref 33–36)
MCV RBC AUTO: 82.2 FL (ref 80–94)
MONOCYTES # BLD AUTO: 0.58 X10(3)/MCL (ref 0.1–1.3)
MONOCYTES NFR BLD AUTO: 6 %
NEUTROPHILS # BLD AUTO: 6.13 X10(3)/MCL (ref 2.1–9.2)
NEUTROPHILS NFR BLD AUTO: 63.1 %
NRBC BLD AUTO-RTO: 0 %
PLATELET # BLD AUTO: 304 X10(3)/MCL (ref 130–400)
PMV BLD AUTO: 10.8 FL (ref 7.4–10.4)
POTASSIUM SERPL-SCNC: 4 MMOL/L (ref 3.5–5.1)
PROT SERPL-MCNC: 6.9 GM/DL (ref 5.8–7.6)
RBC # BLD AUTO: 4.73 X10(6)/MCL (ref 4.2–5.4)
SODIUM SERPL-SCNC: 139 MMOL/L (ref 136–145)
T4 FREE SERPL-MCNC: 0.81 NG/DL (ref 0.7–1.48)
TSH SERPL-ACNC: 2.51 UIU/ML (ref 0.35–4.94)
WBC # SPEC AUTO: 9.71 X10(3)/MCL (ref 4.5–11.5)

## 2023-06-08 PROCEDURE — 84439 ASSAY OF FREE THYROXINE: CPT

## 2023-06-08 PROCEDURE — 84443 ASSAY THYROID STIM HORMONE: CPT

## 2023-06-08 PROCEDURE — 85025 COMPLETE CBC W/AUTO DIFF WBC: CPT

## 2023-06-08 PROCEDURE — 80053 COMPREHEN METABOLIC PANEL: CPT

## 2023-06-08 PROCEDURE — 84480 ASSAY TRIIODOTHYRONINE (T3): CPT

## 2023-06-08 PROCEDURE — 36415 COLL VENOUS BLD VENIPUNCTURE: CPT

## 2023-06-09 LAB — T3 SERPL IA-MCNC: 100 NG/DL (ref 80–200)

## 2023-06-14 ENCOUNTER — OFFICE VISIT (OUTPATIENT)
Dept: INTERNAL MEDICINE | Facility: CLINIC | Age: 63
End: 2023-06-14
Payer: COMMERCIAL

## 2023-06-14 VITALS — WEIGHT: 187 LBS | HEIGHT: 60 IN | BODY MASS INDEX: 36.71 KG/M2

## 2023-06-14 DIAGNOSIS — N60.92 ATYPICAL DUCTAL HYPERPLASIA OF LEFT BREAST: ICD-10-CM

## 2023-06-14 DIAGNOSIS — E03.9 ACQUIRED HYPOTHYROIDISM: Primary | ICD-10-CM

## 2023-06-14 DIAGNOSIS — G47.33 OSA (OBSTRUCTIVE SLEEP APNEA): ICD-10-CM

## 2023-06-14 PROCEDURE — 3008F BODY MASS INDEX DOCD: CPT | Mod: CPTII,,, | Performed by: INTERNAL MEDICINE

## 2023-06-14 PROCEDURE — 1159F MED LIST DOCD IN RCRD: CPT | Mod: CPTII,,, | Performed by: INTERNAL MEDICINE

## 2023-06-14 PROCEDURE — 1159F PR MEDICATION LIST DOCUMENTED IN MEDICAL RECORD: ICD-10-PCS | Mod: CPTII,,, | Performed by: INTERNAL MEDICINE

## 2023-06-14 PROCEDURE — 99214 PR OFFICE/OUTPT VISIT, EST, LEVL IV, 30-39 MIN: ICD-10-PCS | Mod: ,,, | Performed by: INTERNAL MEDICINE

## 2023-06-14 PROCEDURE — 99214 OFFICE O/P EST MOD 30 MIN: CPT | Mod: ,,, | Performed by: INTERNAL MEDICINE

## 2023-06-14 PROCEDURE — 3008F PR BODY MASS INDEX (BMI) DOCUMENTED: ICD-10-PCS | Mod: CPTII,,, | Performed by: INTERNAL MEDICINE

## 2023-06-14 PROCEDURE — 1160F PR REVIEW ALL MEDS BY PRESCRIBER/CLIN PHARMACIST DOCUMENTED: ICD-10-PCS | Mod: CPTII,,, | Performed by: INTERNAL MEDICINE

## 2023-06-14 PROCEDURE — 1160F RVW MEDS BY RX/DR IN RCRD: CPT | Mod: CPTII,,, | Performed by: INTERNAL MEDICINE

## 2023-06-14 NOTE — ASSESSMENT & PLAN NOTE
- has positive sleep study and diagnosis of sleep apnea however patient never used a CPAP.    -this study was more than 2-3 years ago, will see if it is still valid and if not she may need to repeat another sleep study   -educated on the importance of treating sleep apnea a long-term side effects of leaving and untreated.  Patient verbalizes understanding  - ongoing complaints at every visit regarding fatigue and daytime somnolence    After discussing with the sleep center, patient does not need another sleep study however will need an order to continue the CPAP and an order has been placed.

## 2023-06-14 NOTE — PROGRESS NOTES
Subjective:      Patient ID: Shahanz Chirinos is a 63 y.o. female.    Chief Complaint: Follow-up (6 month Thyroid/)    Ms. Christie is a 62-year-old female here today for  a wellness visit. Medical comorbidities include hypothyroidism, vitamin D deficiency, GERD, DDD and atypical ductal hyperplasia of her left breast for which she is currently on Femara after excision of her breast lesion.   Also has obstructive sleep apnea and needs to get her CPAP order completed.  Patient's biggest complaint is always fatigue and daytime somnolence.  At 1st she did not remember that she had had a sleep study already.  Fortunately I had a notation of this.  We will check with the sleep lab to make sure if her previous sleep study would be valid still versus whether she would need to repeat 1 again.  Was educated at length on the importance of getting the sleep apnea treated so that her fatigue and other symptoms start to feel better.  Labs are  reviewed and noted to be all within normal range.        Wellness: 2022  MM2022  CRS: 10/2021  DEXA: 2019  Pap: Hysterectomy    The patient's Health Maintenance was reviewed and the following appears to be due at this time:   Health Maintenance Due   Topic Date Due    Hepatitis C Screening  Never done          Past Medical History:  Past Medical History:   Diagnosis Date    Abdominal pain     Achilles tendonitis     Acquired hypothyroidism     Acute sinusitis     Cholelithiasis     DCIS (ductal carcinoma in situ)     Gastric reflux     Malaise and fatigue     Osteopenia     Vitamin D deficiency      Past Surgical History:   Procedure Laterality Date     SECTION      EXC. OF BREAST LESION ID BY RADIOLOGICAL Left     KNEE SURGERY      LAPAROSCOPIC CHOLECYSTECTOMY      TOTAL ABDOMINAL HYSTERECTOMY      Per Wilton Bernabe     Review of patient's allergies indicates:  No Known Allergies  Social History     Socioeconomic History    Marital status:    Tobacco Use     Smoking status: Never    Smokeless tobacco: Never   Substance and Sexual Activity    Alcohol use: Yes    Drug use: Never    Sexual activity: Not Currently     Social Determinants of Health     Financial Resource Strain: Low Risk     Difficulty of Paying Living Expenses: Not hard at all   Food Insecurity: No Food Insecurity    Worried About Running Out of Food in the Last Year: Never true    Ran Out of Food in the Last Year: Never true   Transportation Needs: No Transportation Needs    Lack of Transportation (Medical): No    Lack of Transportation (Non-Medical): No   Physical Activity: Sufficiently Active    Days of Exercise per Week: 5 days    Minutes of Exercise per Session: 60 min   Stress: No Stress Concern Present    Feeling of Stress : Not at all   Social Connections: Unknown    Frequency of Communication with Friends and Family: More than three times a week    Frequency of Social Gatherings with Friends and Family: More than three times a week    Attends [employer] Services: Patient refused    Active Member of Clubs or Organizations: No    Attends Club or Organization Meetings: Never    Marital Status:    Housing Stability: Low Risk     Unable to Pay for Housing in the Last Year: No    Number of Places Lived in the Last Year: 2    Unstable Housing in the Last Year: No     Family History   Problem Relation Age of Onset    Heart failure Mother     COPD Mother     Cancer Father     Diabetes Brother     Peripheral vascular disease Brother     Lung cancer Brother        Review of Systems    A comprehensive review of systems was performed and is negative except for that stated above  Objective:   BP (P) 130/76 (BP Location: Left arm, Patient Position: Sitting, BP Method: Small (Manual))   Pulse (P) 73   Temp (P) 97.9 °F (36.6 °C) (Temporal)   Ht 5' (1.524 m)   Wt 84.8 kg (187 lb)   SpO2 (P) 98%   BMI 36.52 kg/m²     Physical Exam  Constitutional:       Appearance: Normal appearance.   HENT:      Head:  Normocephalic and atraumatic.      Nose: Nose normal.      Mouth/Throat:      Mouth: Mucous membranes are moist.      Pharynx: Oropharynx is clear.   Eyes:      Extraocular Movements: Extraocular movements intact.      Pupils: Pupils are equal, round, and reactive to light.   Cardiovascular:      Rate and Rhythm: Normal rate and regular rhythm.      Pulses: Normal pulses.   Pulmonary:      Effort: Pulmonary effort is normal.      Breath sounds: Normal breath sounds.   Abdominal:      General: Bowel sounds are normal.      Palpations: Abdomen is soft.   Musculoskeletal:         General: Normal range of motion.      Cervical back: Normal range of motion and neck supple.   Skin:     General: Skin is warm.   Neurological:      General: No focal deficit present.      Mental Status: She is alert and oriented to person, place, and time. Mental status is at baseline.   Psychiatric:         Mood and Affect: Mood normal.     Assessment/ Plan:   1. Acquired hypothyroidism  Assessment & Plan:   - on levothyroxine 75 mcg p.o. daily, continue  - thyroid labs noted to be normal      2. SIRISHA (obstructive sleep apnea)  Assessment & Plan:    - has positive sleep study and diagnosis of sleep apnea however patient never used a CPAP.    -this study was more than 2-3 years ago, will see if it is still valid and if not she may need to repeat another sleep study   -educated on the importance of treating sleep apnea a long-term side effects of leaving and untreated.  Patient verbalizes understanding  - ongoing complaints at every visit regarding fatigue and daytime somnolence    After discussing with the sleep center, patient does not need another sleep study however will need an order to continue the CPAP and an order has been placed.      3. Atypical ductal hyperplasia of left breast  Assessment & Plan:   - on Femara, continue, followed by Oncology

## 2023-06-14 NOTE — ASSESSMENT & PLAN NOTE
- on levothyroxine 75 mcg p.o. daily, continue  - thyroid labs noted to be normal   Transaminitis UTI (urinary tract infection)

## 2023-07-17 ENCOUNTER — OFFICE VISIT (OUTPATIENT)
Dept: INTERNAL MEDICINE | Facility: CLINIC | Age: 63
End: 2023-07-17
Payer: COMMERCIAL

## 2023-07-17 VITALS
WEIGHT: 196.81 LBS | OXYGEN SATURATION: 99 % | BODY MASS INDEX: 38.64 KG/M2 | SYSTOLIC BLOOD PRESSURE: 120 MMHG | RESPIRATION RATE: 16 BRPM | DIASTOLIC BLOOD PRESSURE: 75 MMHG | HEIGHT: 60 IN | TEMPERATURE: 98 F | HEART RATE: 76 BPM

## 2023-07-17 DIAGNOSIS — S16.1XXA STRAIN OF NECK MUSCLE, INITIAL ENCOUNTER: ICD-10-CM

## 2023-07-17 DIAGNOSIS — M62.838 MUSCLE SPASMS OF NECK: Primary | ICD-10-CM

## 2023-07-17 PROCEDURE — 3008F BODY MASS INDEX DOCD: CPT | Mod: CPTII,,,

## 2023-07-17 PROCEDURE — 1160F RVW MEDS BY RX/DR IN RCRD: CPT | Mod: CPTII,,,

## 2023-07-17 PROCEDURE — 1159F PR MEDICATION LIST DOCUMENTED IN MEDICAL RECORD: ICD-10-PCS | Mod: CPTII,,,

## 2023-07-17 PROCEDURE — 3008F PR BODY MASS INDEX (BMI) DOCUMENTED: ICD-10-PCS | Mod: CPTII,,,

## 2023-07-17 PROCEDURE — 99213 OFFICE O/P EST LOW 20 MIN: CPT | Mod: ,,,

## 2023-07-17 PROCEDURE — 1160F PR REVIEW ALL MEDS BY PRESCRIBER/CLIN PHARMACIST DOCUMENTED: ICD-10-PCS | Mod: CPTII,,,

## 2023-07-17 PROCEDURE — 3078F PR MOST RECENT DIASTOLIC BLOOD PRESSURE < 80 MM HG: ICD-10-PCS | Mod: CPTII,,,

## 2023-07-17 PROCEDURE — 3074F PR MOST RECENT SYSTOLIC BLOOD PRESSURE < 130 MM HG: ICD-10-PCS | Mod: CPTII,,,

## 2023-07-17 PROCEDURE — 99213 PR OFFICE/OUTPT VISIT, EST, LEVL III, 20-29 MIN: ICD-10-PCS | Mod: ,,,

## 2023-07-17 PROCEDURE — 3078F DIAST BP <80 MM HG: CPT | Mod: CPTII,,,

## 2023-07-17 PROCEDURE — 1159F MED LIST DOCD IN RCRD: CPT | Mod: CPTII,,,

## 2023-07-17 PROCEDURE — 3074F SYST BP LT 130 MM HG: CPT | Mod: CPTII,,,

## 2023-07-17 RX ORDER — MELOXICAM 7.5 MG/1
7.5 TABLET ORAL 2 TIMES DAILY PRN
Qty: 20 TABLET | Refills: 0 | Status: SHIPPED | OUTPATIENT
Start: 2023-07-17 | End: 2023-07-27

## 2023-07-17 RX ORDER — CYCLOBENZAPRINE HCL 5 MG
5 TABLET ORAL NIGHTLY
Qty: 3 TABLET | Refills: 0 | Status: SHIPPED | OUTPATIENT
Start: 2023-07-17 | End: 2023-07-20

## 2023-07-17 NOTE — PROGRESS NOTES
Patient ID: Shahnaz Chirinos is a 63 y.o. female.    Chief Complaint: Neck Pain (5 days )    63-year-old female presents today for requested visit.Medical comorbidities include hypothyroidism, vitamin D deficiency, GERD, DDD, SIRISHA, and atypical ductal hyperplasia of her left breast for which she is currently on Femara after excision of her breast lesion.   Today presents with complaints of neck stiffness/discomfort.  Onset of symptoms noted following spin class.  Denies known traumas.  See below.    Neck Pain   This is a new problem. The current episode started in the past 7 days. The problem has been gradually improving. The pain is associated with a twisting injury. The pain is present in the left side and right side. The quality of the pain is described as aching. The pain is moderate. The symptoms are aggravated by twisting. The pain is Same all the time. Pertinent negatives include no chest pain, fever, headaches, numbness, paresis, tingling, trouble swallowing or weakness. She has tried heat, bed rest and NSAIDs for the symptoms. The treatment provided mild relief.     MEDICAL HISTORY:    Past Medical History:   Diagnosis Date    Abdominal pain     Achilles tendonitis     Acquired hypothyroidism     Acute sinusitis     Cholelithiasis     DCIS (ductal carcinoma in situ)     Gastric reflux     Malaise and fatigue     Osteopenia     Vitamin D deficiency       Past Surgical History:   Procedure Laterality Date     SECTION      EXC. OF BREAST LESION ID BY RADIOLOGICAL Left     KNEE SURGERY      LAPAROSCOPIC CHOLECYSTECTOMY      TOTAL ABDOMINAL HYSTERECTOMY      Per Wil[facility] Bernabe      Social History     Tobacco Use    Smoking status: Never    Smokeless tobacco: Never   Substance Use Topics    Alcohol use: Yes    Drug use: Never          Health Maintenance Due   Topic Date Due    Hepatitis C Screening  Never done          Patient Care Team:  Maia Oakes MD as PCP - General (Internal  Medicine)  Shira Morrell MD as Consulting Physician (Hematology and Oncology)      Review of Systems   Constitutional:  Negative for fatigue and fever.   HENT:  Negative for congestion, rhinorrhea, sore throat and trouble swallowing.    Eyes:  Negative for redness and visual disturbance.   Respiratory:  Negative for cough, chest tightness and shortness of breath.    Cardiovascular:  Negative for chest pain and palpitations.   Gastrointestinal:  Negative for abdominal pain, constipation, diarrhea, nausea and vomiting.   Genitourinary:  Negative for dysuria, flank pain, frequency and urgency.   Musculoskeletal:  Positive for neck pain and neck stiffness. Negative for arthralgias, gait problem and myalgias.   Skin:  Negative for rash and wound.   Neurological:  Negative for tingling, facial asymmetry, speech difficulty, weakness, numbness and headaches.   All other systems reviewed and are negative.    Objective:   /75 (BP Location: Left arm, Patient Position: Sitting)   Pulse 76   Temp 98.2 °F (36.8 °C)   Resp 16   Ht 5' (1.524 m)   Wt 89.3 kg (196 lb 12.8 oz)   SpO2 99%   BMI 38.43 kg/m²      Physical Exam  Constitutional:       General: She is not in acute distress.     Appearance: Normal appearance.   HENT:      Right Ear: Tympanic membrane, ear canal and external ear normal.      Left Ear: Tympanic membrane, ear canal and external ear normal.      Nose: Nose normal.      Mouth/Throat:      Mouth: Mucous membranes are moist.      Pharynx: Oropharynx is clear.   Eyes:      Extraocular Movements: Extraocular movements intact.      Conjunctiva/sclera: Conjunctivae normal.      Pupils: Pupils are equal, round, and reactive to light.   Cardiovascular:      Rate and Rhythm: Normal rate and regular rhythm.      Pulses: Normal pulses.      Heart sounds: Normal heart sounds. No murmur heard.    No gallop.   Pulmonary:      Effort: Pulmonary effort is normal.      Breath sounds: Normal breath sounds. No  wheezing.   Abdominal:      General: Bowel sounds are normal. There is no distension.      Palpations: Abdomen is soft. There is no mass.      Tenderness: There is no abdominal tenderness. There is no guarding.   Musculoskeletal:      Cervical back: No edema, erythema or signs of trauma. Pain with movement and muscular tenderness present. No spinous process tenderness. Decreased range of motion (Discomfort).   Skin:     General: Skin is warm and dry.   Neurological:      Mental Status: She is alert. Mental status is at baseline.      Sensory: No sensory deficit.      Motor: No weakness.         Assessment:       ICD-10-CM ICD-9-CM   1. Muscle spasms of neck  M62.838 728.85   2. Strain of neck muscle, initial encounter  S16.1XXA 847.0        Plan:     Problem List Items Addressed This Visit          Orthopedic    Muscle spasms of neck - Primary     -acute  -initiate Mobic p.r.n.  -initiate Flexeril p.r.n. short course  -heat application  -encourage passive range of motion   -consider physical therapy imaging if symptoms persist         Relevant Medications    meloxicam (MOBIC) 7.5 MG tablet    Cervical strain     -acute  -initiate Mobic p.r.n.  -initiate Flexeril p.r.n. short course  -heat application  -encourage passive range of motion   -consider physical therapy imaging if symptoms persist         Relevant Medications    meloxicam (MOBIC) 7.5 MG tablet          Follow up for Previously scheduled and PRN if need.   -plan specifics discussed above    Orders Placed This Encounter    meloxicam (MOBIC) 7.5 MG tablet    cyclobenzaprine (FLEXERIL) 5 MG tablet        Medication List with Changes/Refills   New Medications    MELOXICAM (MOBIC) 7.5 MG TABLET    Take 1 tablet (7.5 mg total) by mouth 2 (two) times daily as needed for Pain.   Current Medications    ASPIRIN (ECOTRIN) 81 MG EC TABLET    Take 81 mg by mouth.    CHOLECALCIFEROL, VITAMIN D3, 125 MCG (5,000 UNIT) TAB    Take 5,000 Units by mouth once daily.     FAMOTIDINE (PEPCID) 20 MG TABLET    Take 20 mg by mouth daily as needed.    LETROZOLE (FEMARA) 2.5 MG TAB    TAKE 1 TABLET BY MOUTH EVERY DAY    LEVOTHYROXINE (SYNTHROID) 75 MCG TABLET    Take 1 tablet (75 mcg total) by mouth before breakfast.

## 2023-07-21 PROBLEM — S16.1XXA CERVICAL STRAIN: Status: ACTIVE | Noted: 2023-07-21

## 2023-07-21 PROBLEM — M62.838 MUSCLE SPASMS OF NECK: Status: ACTIVE | Noted: 2023-07-21

## 2023-07-21 NOTE — ASSESSMENT & PLAN NOTE
-acute  -initiate Mobic p.r.n.  -initiate Flexeril p.r.n. short course  -heat application  -encourage passive range of motion   -consider physical therapy imaging if symptoms persist

## 2023-08-29 ENCOUNTER — OFFICE VISIT (OUTPATIENT)
Dept: HEMATOLOGY/ONCOLOGY | Facility: CLINIC | Age: 63
End: 2023-08-29
Payer: COMMERCIAL

## 2023-08-29 ENCOUNTER — HOSPITAL ENCOUNTER (OUTPATIENT)
Dept: RADIOLOGY | Facility: HOSPITAL | Age: 63
Discharge: HOME | End: 2023-08-29
Attending: INTERNAL MEDICINE
Payer: COMMERCIAL

## 2023-08-29 VITALS — BODY MASS INDEX: 37.36 KG/M2 | WEIGHT: 191.31 LBS

## 2023-08-29 DIAGNOSIS — Z91.89 AT HIGH RISK FOR BREAST CANCER: Primary | ICD-10-CM

## 2023-08-29 DIAGNOSIS — N60.92 ATYPICAL DUCTAL HYPERPLASIA OF LEFT BREAST: ICD-10-CM

## 2023-08-29 DIAGNOSIS — M85.80 OSTEOPENIA DUE TO CANCER THERAPY: ICD-10-CM

## 2023-08-29 DIAGNOSIS — Z79.811 USE OF LETROZOLE (FEMARA): ICD-10-CM

## 2023-08-29 DIAGNOSIS — Z91.89 AT HIGH RISK FOR BREAST CANCER: ICD-10-CM

## 2023-08-29 PROCEDURE — 1160F RVW MEDS BY RX/DR IN RCRD: CPT | Mod: CPTII,S$GLB,, | Performed by: NURSE PRACTITIONER

## 2023-08-29 PROCEDURE — 77080 DXA BONE DENSITY AXIAL SKELETON 1 OR MORE SITES: ICD-10-PCS | Mod: 26,,, | Performed by: RADIOLOGY

## 2023-08-29 PROCEDURE — 3008F PR BODY MASS INDEX (BMI) DOCUMENTED: ICD-10-PCS | Mod: CPTII,S$GLB,, | Performed by: NURSE PRACTITIONER

## 2023-08-29 PROCEDURE — 99214 PR OFFICE/OUTPT VISIT, EST, LEVL IV, 30-39 MIN: ICD-10-PCS | Mod: S$GLB,ICN,, | Performed by: NURSE PRACTITIONER

## 2023-08-29 PROCEDURE — 99999 PR PBB SHADOW E&M-EST. PATIENT-LVL III: ICD-10-PCS | Mod: PBBFAC,,, | Performed by: NURSE PRACTITIONER

## 2023-08-29 PROCEDURE — 1160F PR REVIEW ALL MEDS BY PRESCRIBER/CLIN PHARMACIST DOCUMENTED: ICD-10-PCS | Mod: CPTII,S$GLB,, | Performed by: NURSE PRACTITIONER

## 2023-08-29 PROCEDURE — 3008F BODY MASS INDEX DOCD: CPT | Mod: CPTII,S$GLB,, | Performed by: NURSE PRACTITIONER

## 2023-08-29 PROCEDURE — 99214 OFFICE O/P EST MOD 30 MIN: CPT | Mod: S$GLB,ICN,, | Performed by: NURSE PRACTITIONER

## 2023-08-29 PROCEDURE — 1159F MED LIST DOCD IN RCRD: CPT | Mod: CPTII,S$GLB,, | Performed by: NURSE PRACTITIONER

## 2023-08-29 PROCEDURE — 1159F PR MEDICATION LIST DOCUMENTED IN MEDICAL RECORD: ICD-10-PCS | Mod: CPTII,S$GLB,, | Performed by: NURSE PRACTITIONER

## 2023-08-29 PROCEDURE — 77080 DXA BONE DENSITY AXIAL: CPT | Mod: TC

## 2023-08-29 PROCEDURE — 99999 PR PBB SHADOW E&M-EST. PATIENT-LVL III: CPT | Mod: PBBFAC,,, | Performed by: NURSE PRACTITIONER

## 2023-08-29 PROCEDURE — 77080 DXA BONE DENSITY AXIAL: CPT | Mod: 26,,, | Performed by: RADIOLOGY

## 2023-08-29 NOTE — PROGRESS NOTES
HEMATOLOGY/ONCOLOGY OFFICE CLINIC VISIT    Visit Information:      Initial Consultation: 7/9/2019  Referring Physician: Dr. Cook  Other Physicians:  Code Status:    Diagnosis/Problem list:   Atypical ductal hyperplasia of the left breast--Dx 4/2019    Present Treatment:  Femara 2.5 mg daily    Treatment history:    Left breast excisional biopsy on 6/14/2019    Plan of care: Endocrine chemoprophylaxis therapy x 5 yrs      Imaging:  Screening mammogram 4/18/2019 was read as BI-RADS Category 0  Diagnostic Sammy left mammogram and ultrasound 5/13/2019, left breast 9 o'clock position 3 cm from the nipple mass and suspicious.  BI-RADS Category 4. Two additional masses in the left breast seen sonographically favored to represent complicated cysts.  BI-RADS Category 3: Probably benign.  Left breast 12 o'clock position 3 cm from the nipple complicated cyst, benign, BI-RADS Category 2.  Bone density 7/11/2019-normal  MRI breast 11/27/19: Post surgical change of the 9:00 left breast related to recent excisional biopsy.  Multiple benign cysts throughout both breasts. MRI BI-RADS: 2 Benign  BILATERAL DIGITAL DIAGNOSTIC MAMMOGRAM 3D/2D WITH CAD AND TARGETED LEFT ULTRASOUND: 2/11/2020  IMPRESSION: BENIGN, ULTRASOUND PROBABLY BENIGN   1) Left breast 9:00 axis periareolar position fat necrosis at the site of prior surgical excisional biopsy correlates with the patient's area of palpable concern and is benign. BI-RADS 2: benign.  2) Left breast 11:00 axis 3 cm FN mass is stable over 9 months of sonographic follow-up and is still considered probably benign, most likely a complicated cyst. BI-RADS 3: probably benign.   3) Left breast 10:00 axis 4 cm FN simple cyst is benign. BI-RADS 2: benign.  4) No mammographic evidence of malignancy in either breast.  RECOMMENDATIONS:  Recommend continued follow-up of the left breast 11:00 axis 3 cm FN mass per the standard BI-RADS 3 protocol.    US left breast 5/14/2020: No sonographic evidence  of malignancy is identified in the 11:00 left breast. Simple cyst in the 11:00 left breast is benign. Ultrasound BI-RADS: 2 Benign   MRI of the breast 11/17/2020 with no suspicious enhancement in either breast to suggest malignancy.  Routine screening mammography in February 2021 is recommended with additional consideration of continue on a supplement of breast cancer screening with dynamic contrast-enhanced breast MRI ideally 6 months following mammogram in this high risk patient with a lifetime risk of 35.1%.  MRI of the breast 12/12/2022:   MMG 2/23/2023: -RADS: 2 Benign. Continued annual mammography and supplemental breast screening with dynamic contrast enhanced breast MRI is suggested for this high-risk patient, preferably alternating mammography and MRI every 6 months.  - Routine annual screening mammography is due in 2 months unless clinical signs or symptoms warrant earlier evaluation (February 2023).    Pathology:    CLINICAL HISTORY:       Patient: 63 y/o female kindly referred by Dr. Cook for atypical ductal hyperplasia. Screening mammogram performed on 4/18/2019 was read as BI-RADS Category 0 further imaging left diagnostic mammography with tomosynthesis was recommended to evaluate annual asymmetry in the left breast posterior depth central to the nipple on the cranial caudal view.  On 5/13/2019, diagnostic Sammy left mammogram and ultrasound show a left breast 9 o'clock position 3 cm from the nipple mass and suspicious.  BI-RADS Category 4.  2 additional masses in the left breast seen sonographically favored to represent complicated cysts.  BI-RADS Category 3: Probably benign.  Left breast 12 o'clock position 3 cm from the nipple complicated cyst, benign, BI-RADS Category 2.  Ultrasound-guided biopsy of the left breast at the 9 o'clock position was performed on 5/24/2019.  Pathology report revealed a typical papilloma, papilloma with atypical ductal hyperplasia).  An intracystic papillary  carcinoma/ductal carcinoma in situ cannot be rule out.  Surgical consultation was recommended.    Left breast excisional biopsy on 6/14/2019 showed focal residual a typical papilloma.  No evidence of invasive carcinoma.  Patient was then referred for chemoprophylaxis.    Patient does not have history of breast or ovarian cancer.  There is lung and throat cancer in her family.  She took birth control pills of unknown when younger.  Partial hysterectomy and unilateral oophorectomy in her mid 30s.      Chief Complaint: OTHER (No concerns today./)      Interval History:  Patient is here today in follow-up of atypical ductal hyperplasia. She has no complaints today.  She is taking letrozole and tolerating fairly well. She does notice increased knee discomfort since starting. Tolerable at this time. She had a bone density test today- pending at this time.   She had annual screening mammogram on 2/23/23 that was benign. She is doing well, no fever, chills or sweats. No chest pain or shortness of breath. No bleeding. She reports no changes in monthly SBE.    ROS:All 14 points ROS taken and positive as per Interval History, all other negative.  Review of Systems   Constitutional:  Negative for chills, fever and weight loss.   HENT:  Negative for congestion and nosebleeds.    Eyes:  Negative for blurred vision, double vision and photophobia.   Respiratory:  Negative for cough, hemoptysis and shortness of breath.    Cardiovascular:  Negative for chest pain, palpitations, leg swelling and PND.   Gastrointestinal:  Negative for abdominal pain, blood in stool, constipation, diarrhea, melena, nausea and vomiting.   Genitourinary:  Negative for dysuria, frequency, hematuria and urgency.   Musculoskeletal:  Negative for back pain, falls and myalgias.   Skin:  Negative for itching and rash.   Neurological:  Negative for tremors, focal weakness, seizures, weakness and headaches.   Endo/Heme/Allergies:  Negative for environmental  allergies. Does not bruise/bleed easily.   Psychiatric/Behavioral:  Negative for depression and suicidal ideas. The patient is not nervous/anxious.          Histories:  PMH/PSH/FH/SOCIAL/ALLERGIES AND MEDS REVIEWED AND UPDATED AS APPROPRIATE       Vitals:    08/29/23 1452   Weight: 86.8 kg (191 lb 4.8 oz)          Physical Exam  Constitutional:       Appearance: Normal appearance.   HENT:      Head: Normocephalic and atraumatic.   Eyes:      General: No scleral icterus.     Extraocular Movements: Extraocular movements intact.      Conjunctiva/sclera: Conjunctivae normal.   Neck:      Vascular: No JVD.   Cardiovascular:      Rate and Rhythm: Normal rate and regular rhythm.      Heart sounds: No murmur heard.  Pulmonary:      Effort: Pulmonary effort is normal.      Breath sounds: Normal breath sounds. No wheezing or rhonchi.   Chest:   Breasts:     Right: Normal. No swelling, mass, nipple discharge, skin change or tenderness.      Left: Normal. No swelling, mass, nipple discharge, skin change or tenderness.   Abdominal:      General: Bowel sounds are normal. There is no distension.      Palpations: Abdomen is soft. There is no mass.      Tenderness: There is no abdominal tenderness.   Musculoskeletal:      Cervical back: Neck supple.   Lymphadenopathy:      Head:      Right side of head: No submandibular adenopathy.      Left side of head: No submandibular adenopathy.      Cervical: No cervical adenopathy.      Upper Body:      Right upper body: No supraclavicular or axillary adenopathy.      Left upper body: No supraclavicular or axillary adenopathy.      Lower Body: No right inguinal adenopathy. No left inguinal adenopathy.   Skin:     General: Skin is warm.      Coloration: Skin is not jaundiced.      Findings: No lesion or rash.      Nails: There is no clubbing.   Neurological:      Mental Status: She is alert and oriented to person, place, and time.      Cranial Nerves: Cranial nerves 2-12 are intact.    Psychiatric:         Attention and Perception: Attention normal.         Behavior: Behavior is cooperative.         Judgment: Judgment normal.       ECOG SCORE             Laboratory:  CBC with Differential:  Lab Results   Component Value Date    WBC 10.88 08/29/2023    RBC 4.96 08/29/2023    HGB 13.7 08/29/2023    HCT 40.8 08/29/2023    MCV 82.3 08/29/2023    MCH 27.6 08/29/2023    MCHC 33.6 08/29/2023    RDW 13.5 08/29/2023     08/29/2023    MPV 9.7 08/29/2023        CMP:  Sodium Level   Date Value Ref Range Status   06/08/2023 139 136 - 145 mmol/L Final     Potassium Level   Date Value Ref Range Status   06/08/2023 4.0 3.5 - 5.1 mmol/L Final     Carbon Dioxide   Date Value Ref Range Status   06/08/2023 29 23 - 31 mmol/L Final     Blood Urea Nitrogen   Date Value Ref Range Status   06/08/2023 14.5 9.8 - 20.1 mg/dL Final     Creatinine   Date Value Ref Range Status   06/08/2023 0.90 0.55 - 1.02 mg/dL Final     Calcium Level Total   Date Value Ref Range Status   06/08/2023 9.3 8.4 - 10.2 mg/dL Final     Albumin Level   Date Value Ref Range Status   06/08/2023 3.9 3.4 - 4.8 g/dL Final     Bilirubin Total   Date Value Ref Range Status   06/08/2023 0.8 <=1.5 mg/dL Final     Alkaline Phosphatase   Date Value Ref Range Status   06/08/2023 114 40 - 150 unit/L Final     Aspartate Aminotransferase   Date Value Ref Range Status   06/08/2023 20 5 - 34 unit/L Final     Alanine Aminotransferase   Date Value Ref Range Status   06/08/2023 13 0 - 55 unit/L Final     Estimated GFR-Non    Date Value Ref Range Status   02/10/2022 >60           Assessment:       1. At high risk for breast cancer    2. Atypical ductal hyperplasia of left breast          1) Atypical ductal hyperplasia of the left breast-- marker or risk factor for developing breast cancer in the future. Women with atypical hyperplasia have a lifetime risk of breast cancer that is about four times higher than that of women who don't have atypical  hyperplasia: At 5 years after diagnosis, about 7 percent of women with atypical hyperplasia may develop breast cancer.  At 10 years after diagnosis, about 13 percent of women with atypical hyperplasia may develop breast cancer. At 25 years after diagnosis, about 30 percent of women with atypical hyperplasia may develop breast cancer.-- Treatment with medications such as tamoxifen or raloxifene (Evista), or aromatase inhibitors, such as exemestane (Aromasin) and anastrozole (Arimidex) for five years may reduce the risk of breast cancer.  Discussed risks vs benefits as well as toxicities associated with these medications. She was started on Aromasin--too expensive, now on Femara  Patient with an elevated lifetime risk of developing breast cancer (35.1% according to the Tyrer-Cuzick model). Will cont mammograms alternating with Breast MRI's  2) Hot flashes due to AI's  3) Appointment with Dr. Caputo for genetic testing--did not meet criteria for testing  4) Bone density 7/11/2019 was normal it will be repeated q 2 yrs.  If osteopenia or osteoporosis then she will be started on Prolia      Plan:         Cont Femara for chemoprophylaxis.   Screening Mammogram due 2/24--ordered.   Last breast MRI was done in 12/2022. Hopefully she can have it done in Sept as ordered so that it done annually but 6 months apart from the mammogram.  RTC in 6 months with after mammogram.      Bone density shows osteopenia. Will start Prolia.   Encouraged to call for any questions or problems  The patient was given ample opportunity to ask questions and they were all answered to satisfaction; patient demonstrated understanding of what we discussed and is agreeable to the plan.         DARREN Wheat

## 2023-08-31 PROBLEM — M85.80 OSTEOPENIA DUE TO CANCER THERAPY: Status: ACTIVE | Noted: 2023-08-31

## 2023-09-21 ENCOUNTER — APPOINTMENT (OUTPATIENT)
Dept: RADIOLOGY | Facility: HOSPITAL | Age: 63
End: 2023-09-21
Attending: INTERNAL MEDICINE
Payer: COMMERCIAL

## 2023-09-21 DIAGNOSIS — Z79.811 USE OF LETROZOLE (FEMARA): ICD-10-CM

## 2023-09-21 DIAGNOSIS — N60.92 ATYPICAL DUCTAL HYPERPLASIA OF LEFT BREAST: ICD-10-CM

## 2023-09-21 DIAGNOSIS — Z91.89 AT HIGH RISK FOR BREAST CANCER: ICD-10-CM

## 2023-09-21 PROCEDURE — 77049 MRI BREAST C-+ W/CAD BI: CPT | Mod: TC

## 2023-09-21 PROCEDURE — 77049 MRI BREAST C-+ W/CAD BI: CPT | Mod: 26,,, | Performed by: RADIOLOGY

## 2023-09-21 PROCEDURE — 25500020 PHARM REV CODE 255: Performed by: INTERNAL MEDICINE

## 2023-09-21 PROCEDURE — 77049 MRI BREAST W/WO CONTRAST, W/CAD, BILATERAL: ICD-10-PCS | Mod: 26,,, | Performed by: RADIOLOGY

## 2023-09-21 PROCEDURE — A9577 INJ MULTIHANCE: HCPCS | Performed by: INTERNAL MEDICINE

## 2023-09-21 RX ADMIN — GADOBENATE DIMEGLUMINE 20 ML: 529 INJECTION, SOLUTION INTRAVENOUS at 09:09

## 2023-09-26 ENCOUNTER — INFUSION (OUTPATIENT)
Dept: INFUSION THERAPY | Facility: HOSPITAL | Age: 63
End: 2023-09-26
Attending: NURSE PRACTITIONER
Payer: COMMERCIAL

## 2023-09-26 VITALS
SYSTOLIC BLOOD PRESSURE: 120 MMHG | OXYGEN SATURATION: 98 % | RESPIRATION RATE: 18 BRPM | DIASTOLIC BLOOD PRESSURE: 68 MMHG | HEART RATE: 66 BPM

## 2023-09-26 NOTE — PLAN OF CARE
Prolia held today bc pt had teeth extracted a week ago. Will check with her dentist and reschedule when ok to have injection. MARINA Kohli NP notified and stated ok to proceed with Prolia when ok with pt's Dentist. Dc'd home in stable condition.

## 2023-09-28 ENCOUNTER — INFUSION (OUTPATIENT)
Dept: INFUSION THERAPY | Facility: HOSPITAL | Age: 63
End: 2023-09-28
Attending: NURSE PRACTITIONER
Payer: COMMERCIAL

## 2023-09-28 VITALS
HEART RATE: 68 BPM | SYSTOLIC BLOOD PRESSURE: 121 MMHG | TEMPERATURE: 98 F | OXYGEN SATURATION: 97 % | DIASTOLIC BLOOD PRESSURE: 82 MMHG | RESPIRATION RATE: 18 BRPM

## 2023-09-28 DIAGNOSIS — M85.80 OSTEOPENIA DUE TO CANCER THERAPY: Primary | ICD-10-CM

## 2023-09-28 PROCEDURE — 96372 THER/PROPH/DIAG INJ SC/IM: CPT

## 2023-09-28 PROCEDURE — 63600175 PHARM REV CODE 636 W HCPCS: Mod: JZ,JG | Performed by: NURSE PRACTITIONER

## 2023-09-28 RX ADMIN — DENOSUMAB 60 MG: 60 INJECTION SUBCUTANEOUS at 02:09

## 2023-09-28 NOTE — PLAN OF CARE
Pt cleared by dentisit to received prolia after extractions. Prolia q6m given. Tolerated well. Pt monitored for 15 mins after injection with no s/s of adverse rxns. Next appt confirmed. Dc'd home in stable condition.

## 2023-10-09 ENCOUNTER — TELEPHONE (OUTPATIENT)
Dept: INTERNAL MEDICINE | Facility: CLINIC | Age: 63
End: 2023-10-09
Payer: COMMERCIAL

## 2023-10-09 DIAGNOSIS — E03.9 ACQUIRED HYPOTHYROIDISM: ICD-10-CM

## 2023-10-09 DIAGNOSIS — E55.9 VITAMIN D DEFICIENCY: Primary | ICD-10-CM

## 2023-10-09 NOTE — TELEPHONE ENCOUNTER
----- Message from Lisy Du sent at 10/9/2023  8:39 AM CDT -----  .Type:  Needs Medical Advice    Who Called: pt   Symptoms (please be specific):    How long has patient had these symptoms:    Pharmacy name and phone #:    Would the patient rather a call back or a response via [facility]?   Best Call Back Number: [phone] ext [phone]  Additional Information: pt asking to do lab at [facility] she been tired,drained always cold

## 2023-10-09 NOTE — TELEPHONE ENCOUNTER
----- Message from Lisy Du sent at 10/9/2023  8:39 AM CDT -----  .Type:  Needs Medical Advice    Who Called: pt   Symptoms (please be specific):    How long has patient had these symptoms:    Pharmacy name and phone #:    Would the patient rather a call back or a response via [facility]?   Best Call Back Number: [phone] ext [phone]  Additional Information: pt asking to do lab at [facility] she been tired,drained always cold

## 2023-10-10 ENCOUNTER — LAB VISIT (OUTPATIENT)
Dept: LAB | Facility: HOSPITAL | Age: 63
End: 2023-10-10
Attending: INTERNAL MEDICINE
Payer: COMMERCIAL

## 2023-10-10 DIAGNOSIS — E55.9 VITAMIN D DEFICIENCY: ICD-10-CM

## 2023-10-10 DIAGNOSIS — E03.9 ACQUIRED HYPOTHYROIDISM: ICD-10-CM

## 2023-10-10 LAB
DEPRECATED CALCIDIOL+CALCIFEROL SERPL-MC: 37.4 NG/ML (ref 30–80)
TSH SERPL-ACNC: 1.61 UIU/ML (ref 0.35–4.94)

## 2023-10-10 PROCEDURE — 36415 COLL VENOUS BLD VENIPUNCTURE: CPT

## 2023-10-10 PROCEDURE — 82306 VITAMIN D 25 HYDROXY: CPT

## 2023-10-10 PROCEDURE — 84443 ASSAY THYROID STIM HORMONE: CPT

## 2023-10-11 ENCOUNTER — TELEPHONE (OUTPATIENT)
Dept: HEMATOLOGY/ONCOLOGY | Facility: CLINIC | Age: 63
End: 2023-10-11
Payer: COMMERCIAL

## 2023-10-11 NOTE — TELEPHONE ENCOUNTER
Patient recevied prolia on 9/28/23 and has been experiencing low back and lower abdominal discomfort. States the pain is intermittent. Denies any s/sx of UTI. No issues with bowels. She feels that it is r/t the injection. Tried alleve with no relief. Please advise.

## 2023-10-18 ENCOUNTER — TELEPHONE (OUTPATIENT)
Dept: INTERNAL MEDICINE | Facility: CLINIC | Age: 63
End: 2023-10-18
Payer: COMMERCIAL

## 2023-10-18 DIAGNOSIS — E03.8 TSH (THYROID-STIMULATING HORMONE DEFICIENCY): ICD-10-CM

## 2023-10-18 RX ORDER — LEVOTHYROXINE SODIUM 75 UG/1
75 TABLET ORAL
Qty: 90 TABLET | Refills: 3 | Status: SHIPPED | OUTPATIENT
Start: 2023-10-18

## 2023-10-18 NOTE — TELEPHONE ENCOUNTER
----- Message from Kylee Dwyer sent at 10/18/2023  2:48 PM CDT -----  .Type:  RX Refill Request    Who Called: pt   Refill or New Rx:new rx  RX Name and Strength:levothyroxine (SYNTHROID) 75 MCG tablet  How is the patient currently taking it? (ex. 1XDay):1X  Is this a 30 day or 90 day RX:30  Preferred Pharmacy with phone number:[facility] on [address]   Local or Mail Order:local   Ordering Provider:Violette   Would the patient rather a call back or a response via [facility]? Call back   Best Call Back Number:[phone]  Additional Information:

## 2023-11-29 DIAGNOSIS — E03.9 ACQUIRED HYPOTHYROIDISM: Primary | ICD-10-CM

## 2023-11-29 DIAGNOSIS — Z00.00 WELLNESS EXAMINATION: ICD-10-CM

## 2023-11-29 DIAGNOSIS — E55.9 VITAMIN D DEFICIENCY: ICD-10-CM

## 2023-12-08 ENCOUNTER — APPOINTMENT (OUTPATIENT)
Dept: LAB | Facility: HOSPITAL | Age: 63
End: 2023-12-08
Attending: INTERNAL MEDICINE
Payer: COMMERCIAL

## 2023-12-08 DIAGNOSIS — E55.9 VITAMIN D DEFICIENCY: ICD-10-CM

## 2023-12-08 DIAGNOSIS — Z00.00 WELLNESS EXAMINATION: ICD-10-CM

## 2023-12-08 DIAGNOSIS — E03.9 ACQUIRED HYPOTHYROIDISM: ICD-10-CM

## 2023-12-08 LAB
ALBUMIN SERPL-MCNC: 4 G/DL (ref 3.4–4.8)
ALBUMIN/GLOB SERPL: 1.3 RATIO (ref 1.1–2)
ALP SERPL-CCNC: 80 UNIT/L (ref 40–150)
ALT SERPL-CCNC: 13 UNIT/L (ref 0–55)
AST SERPL-CCNC: 20 UNIT/L (ref 5–34)
BASOPHILS # BLD AUTO: 0.09 X10(3)/MCL
BASOPHILS NFR BLD AUTO: 1 %
BILIRUB SERPL-MCNC: 1.2 MG/DL
BUN SERPL-MCNC: 11 MG/DL (ref 9.8–20.1)
CALCIUM SERPL-MCNC: 9.2 MG/DL (ref 8.4–10.2)
CHLORIDE SERPL-SCNC: 107 MMOL/L (ref 98–107)
CHOLEST SERPL-MCNC: 215 MG/DL
CHOLEST/HDLC SERPL: 4 {RATIO} (ref 0–5)
CO2 SERPL-SCNC: 29 MMOL/L (ref 23–31)
CREAT SERPL-MCNC: 0.8 MG/DL (ref 0.55–1.02)
EOSINOPHIL # BLD AUTO: 0.22 X10(3)/MCL (ref 0–0.9)
EOSINOPHIL NFR BLD AUTO: 2.4 %
ERYTHROCYTE [DISTWIDTH] IN BLOOD BY AUTOMATED COUNT: 13.8 % (ref 11.5–17)
EST. AVERAGE GLUCOSE BLD GHB EST-MCNC: 102.5 MG/DL
GFR SERPLBLD CREATININE-BSD FMLA CKD-EPI: >60 MLS/MIN/1.73/M2
GLOBULIN SER-MCNC: 3.2 GM/DL (ref 2.4–3.5)
GLUCOSE SERPL-MCNC: 99 MG/DL (ref 82–115)
HBA1C MFR BLD: 5.2 %
HCT VFR BLD AUTO: 41.6 % (ref 37–47)
HDLC SERPL-MCNC: 59 MG/DL (ref 35–60)
HGB BLD-MCNC: 14.5 G/DL (ref 12–16)
IMM GRANULOCYTES # BLD AUTO: 0.03 X10(3)/MCL (ref 0–0.04)
IMM GRANULOCYTES NFR BLD AUTO: 0.3 %
LDLC SERPL CALC-MCNC: 134 MG/DL (ref 50–140)
LYMPHOCYTES # BLD AUTO: 2.66 X10(3)/MCL (ref 0.6–4.6)
LYMPHOCYTES NFR BLD AUTO: 29.6 %
MCH RBC QN AUTO: 28.4 PG (ref 27–31)
MCHC RBC AUTO-ENTMCNC: 34.9 G/DL (ref 33–36)
MCV RBC AUTO: 81.6 FL (ref 80–94)
MONOCYTES # BLD AUTO: 0.56 X10(3)/MCL (ref 0.1–1.3)
MONOCYTES NFR BLD AUTO: 6.2 %
NEUTROPHILS # BLD AUTO: 5.43 X10(3)/MCL (ref 2.1–9.2)
NEUTROPHILS NFR BLD AUTO: 60.5 %
NRBC BLD AUTO-RTO: 0 %
PLATELET # BLD AUTO: 333 X10(3)/MCL (ref 130–400)
PMV BLD AUTO: 10.7 FL (ref 7.4–10.4)
POTASSIUM SERPL-SCNC: 4.5 MMOL/L (ref 3.5–5.1)
PROT SERPL-MCNC: 7.2 GM/DL (ref 5.8–7.6)
RBC # BLD AUTO: 5.1 X10(6)/MCL (ref 4.2–5.4)
SODIUM SERPL-SCNC: 141 MMOL/L (ref 136–145)
TRIGL SERPL-MCNC: 108 MG/DL (ref 37–140)
TSH SERPL-ACNC: 3.15 UIU/ML (ref 0.35–4.94)
VLDLC SERPL CALC-MCNC: 22 MG/DL
WBC # SPEC AUTO: 8.99 X10(3)/MCL (ref 4.5–11.5)

## 2023-12-08 PROCEDURE — 83036 HEMOGLOBIN GLYCOSYLATED A1C: CPT

## 2023-12-08 PROCEDURE — 85025 COMPLETE CBC W/AUTO DIFF WBC: CPT

## 2023-12-08 PROCEDURE — 84443 ASSAY THYROID STIM HORMONE: CPT

## 2023-12-08 PROCEDURE — 80053 COMPREHEN METABOLIC PANEL: CPT

## 2023-12-08 PROCEDURE — 36415 COLL VENOUS BLD VENIPUNCTURE: CPT

## 2023-12-08 PROCEDURE — 80061 LIPID PANEL: CPT

## 2023-12-14 ENCOUNTER — OFFICE VISIT (OUTPATIENT)
Dept: INTERNAL MEDICINE | Facility: CLINIC | Age: 63
End: 2023-12-14
Payer: COMMERCIAL

## 2023-12-14 VITALS
OXYGEN SATURATION: 98 % | WEIGHT: 194 LBS | HEIGHT: 60 IN | SYSTOLIC BLOOD PRESSURE: 132 MMHG | DIASTOLIC BLOOD PRESSURE: 76 MMHG | HEART RATE: 72 BPM | BODY MASS INDEX: 38.09 KG/M2

## 2023-12-14 DIAGNOSIS — E03.9 ACQUIRED HYPOTHYROIDISM: ICD-10-CM

## 2023-12-14 DIAGNOSIS — Z00.00 WELLNESS EXAMINATION: Primary | Chronic | ICD-10-CM

## 2023-12-14 DIAGNOSIS — G47.33 OSA (OBSTRUCTIVE SLEEP APNEA): ICD-10-CM

## 2023-12-14 DIAGNOSIS — E66.01 SEVERE OBESITY (BMI 35.0-39.9) WITH COMORBIDITY: ICD-10-CM

## 2023-12-14 PROCEDURE — 99396 PREV VISIT EST AGE 40-64: CPT | Mod: ,,, | Performed by: INTERNAL MEDICINE

## 2023-12-14 PROCEDURE — 1160F PR REVIEW ALL MEDS BY PRESCRIBER/CLIN PHARMACIST DOCUMENTED: ICD-10-PCS | Mod: CPTII,,, | Performed by: INTERNAL MEDICINE

## 2023-12-14 PROCEDURE — 3044F PR MOST RECENT HEMOGLOBIN A1C LEVEL <7.0%: ICD-10-PCS | Mod: CPTII,,, | Performed by: INTERNAL MEDICINE

## 2023-12-14 PROCEDURE — 1160F RVW MEDS BY RX/DR IN RCRD: CPT | Mod: CPTII,,, | Performed by: INTERNAL MEDICINE

## 2023-12-14 PROCEDURE — 3075F PR MOST RECENT SYSTOLIC BLOOD PRESS GE 130-139MM HG: ICD-10-PCS | Mod: CPTII,,, | Performed by: INTERNAL MEDICINE

## 2023-12-14 PROCEDURE — 3008F PR BODY MASS INDEX (BMI) DOCUMENTED: ICD-10-PCS | Mod: CPTII,,, | Performed by: INTERNAL MEDICINE

## 2023-12-14 PROCEDURE — 3075F SYST BP GE 130 - 139MM HG: CPT | Mod: CPTII,,, | Performed by: INTERNAL MEDICINE

## 2023-12-14 PROCEDURE — 1159F MED LIST DOCD IN RCRD: CPT | Mod: CPTII,,, | Performed by: INTERNAL MEDICINE

## 2023-12-14 PROCEDURE — 3008F BODY MASS INDEX DOCD: CPT | Mod: CPTII,,, | Performed by: INTERNAL MEDICINE

## 2023-12-14 PROCEDURE — 3078F DIAST BP <80 MM HG: CPT | Mod: CPTII,,, | Performed by: INTERNAL MEDICINE

## 2023-12-14 PROCEDURE — 99396 PR PREVENTIVE VISIT,EST,40-64: ICD-10-PCS | Mod: ,,, | Performed by: INTERNAL MEDICINE

## 2023-12-14 PROCEDURE — 3044F HG A1C LEVEL LT 7.0%: CPT | Mod: CPTII,,, | Performed by: INTERNAL MEDICINE

## 2023-12-14 PROCEDURE — 3078F PR MOST RECENT DIASTOLIC BLOOD PRESSURE < 80 MM HG: ICD-10-PCS | Mod: CPTII,,, | Performed by: INTERNAL MEDICINE

## 2023-12-14 PROCEDURE — 1159F PR MEDICATION LIST DOCUMENTED IN MEDICAL RECORD: ICD-10-PCS | Mod: CPTII,,, | Performed by: INTERNAL MEDICINE

## 2023-12-14 RX ORDER — DENOSUMAB 60 MG/ML
60 INJECTION SUBCUTANEOUS
COMMUNITY

## 2023-12-14 NOTE — PROGRESS NOTES
Subjective:      Patient ID: Shahnaz Chirinos is a 63 y.o. female.    Chief Complaint: Annual Exam (Wellness/)    Ms. Christie is a 63-year-old female here today for  a wellness visit. Medical comorbidities include hypothyroidism, vitamin D deficiency, GERD, DDD and atypical ductal hyperplasia of her left breast for which she is currently on Femara after excision of her breast lesion.   Also has obstructive sleep apnea .  Doing Well, had recently joint spin classes and has lost some weight.  Encouraged to continue.    No other acute needs or complaints as such today.  Labs are reviewed and noted to be all essentially normal.     Wellness: 2023  MM2022  CRS: 10/2021  DEXA: 2019  Pap: Hysterectomy    The patient's Health Maintenance was reviewed and the following appears to be due at this time:   Health Maintenance Due   Topic Date Due    Hepatitis C Screening  Never done    TETANUS VACCINE  10/15/2007    RSV Vaccine (Age 60+ and Pregnant patients) (1 - 1-dose 60+ series) Never done    COVID-19 Vaccine (3 - 2023-24 season) 2023    Mammogram  2024        Past Medical History:  Past Medical History:   Diagnosis Date    Abdominal pain     Achilles tendonitis     Acquired hypothyroidism     Acute sinusitis     Cholelithiasis     DCIS (ductal carcinoma in situ)     Gastric reflux     Malaise and fatigue     Osteopenia     Vitamin D deficiency      Past Surgical History:   Procedure Laterality Date     SECTION      EXC. OF BREAST LESION ID BY RADIOLOGICAL Left     KNEE SURGERY      LAPAROSCOPIC CHOLECYSTECTOMY      TOTAL ABDOMINAL HYSTERECTOMY      Per Wilton Bernabe     Review of patient's allergies indicates:  No Known Allergies  Social History     Socioeconomic History    Marital status:    Tobacco Use    Smoking status: Never    Smokeless tobacco: Never   Substance and Sexual Activity    Alcohol use: Yes    Drug use: Never    Sexual activity: Not Currently     Social Determinants of  Health     Financial Resource Strain: Low Risk  (7/17/2023)    Overall Financial Resource Strain (CARDIA)     Difficulty of Paying Living Expenses: Not hard at all   Food Insecurity: No Food Insecurity (7/17/2023)    Hunger Vital Sign     Worried About Running Out of Food in the Last Year: Never true     Ran Out of Food in the Last Year: Never true   Transportation Needs: No Transportation Needs (7/17/2023)    PRAPARE - Transportation     Lack of Transportation (Medical): No     Lack of Transportation (Non-Medical): No   Physical Activity: Insufficiently Active (7/17/2023)    Exercise Vital Sign     Days of Exercise per Week: 3 days     Minutes of Exercise per Session: 30 min   Stress: No Stress Concern Present (7/17/2023)    [place] [employer] - Occupational Stress Questionnaire     Feeling of Stress : Not at all   Social Connections: Moderately Integrated (7/17/2023)    Social Connection and Isolation Panel [NHANES]     Frequency of Communication with Friends and Family: Three times a week     Frequency of Social Gatherings with Friends and Family: Three times a week     Attends Restorationism Services: More than 4 times per year     Active Member of Clubs or Organizations: No     Attends Club or Organization Meetings: More than 4 times per year     Marital Status:    Housing Stability: Low Risk  (7/17/2023)    Housing Stability Vital Sign     Unable to Pay for Housing in the Last Year: No     Number of Places Lived in the Last Year: 2     Unstable Housing in the Last Year: No     Family History   Problem Relation Age of Onset    Heart failure Mother     COPD Mother     Cancer Father     Diabetes Brother     Peripheral vascular disease Brother     Lung cancer Brother        Review of Systems    A comprehensive review of systems was performed and is negative except for that stated above  Objective:   /76 (BP Location: Left arm, Patient Position: Sitting, BP Method: Large (Manual))    Pulse 72   Ht 5' (1.524 m)   Wt 88 kg (194 lb)   SpO2 98%   BMI 37.89 kg/m²     Physical Exam  Constitutional:       Appearance: Normal appearance.   HENT:      Head: Normocephalic and atraumatic.      Nose: Nose normal.      Mouth/Throat:      Mouth: Mucous membranes are moist.      Pharynx: Oropharynx is clear.   Eyes:      Extraocular Movements: Extraocular movements intact.      Pupils: Pupils are equal, round, and reactive to light.   Cardiovascular:      Rate and Rhythm: Normal rate and regular rhythm.      Pulses: Normal pulses.   Pulmonary:      Effort: Pulmonary effort is normal.      Breath sounds: Normal breath sounds.   Abdominal:      General: Bowel sounds are normal.      Palpations: Abdomen is soft.   Musculoskeletal:         General: Normal range of motion.      Cervical back: Normal range of motion and neck supple.   Skin:     General: Skin is warm.   Neurological:      General: No focal deficit present.      Mental Status: She is alert and oriented to person, place, and time. Mental status is at baseline.   Psychiatric:         Mood and Affect: Mood normal.       Assessment/ Plan:   1. Wellness examination  Assessment & Plan:  -labs are reviewed all essentially normal  -patient is advised on importance of watching her carbohydrate intake and saturated fat intake, making the right nutritional choices and exercising on a regular basis  -up-to-date with the screening       2. Severe obesity (BMI 35.0-39.9) with comorbidity  Assessment & Plan:  Body mass index is 37.89 kg/m².  Goal BMI <30.  Exercise 5 times a week for 30 minutes per day.  Avoid soda, simple sugars, excessive rice, potatoes or bread. Limit fast foods and fried foods.  Choose complex carbs in moderation (example: green vegetables, beans, oatmeal). Eat plenty of fresh fruits and vegetables with lean meats daily.  Do not skip meals. Eat a balanced portion size.  Avoid fad diets. Consider permanent healthy life style changes.        3.  SIRISHA (obstructive sleep apnea)  Assessment & Plan:  -compliance with CPAP is emphasized      4. Acquired hypothyroidism  Assessment & Plan:  -on levothyroxine 75 mcg p.o. daily, continue   -emphasized to take it on an empty stomach    Orders:  -     TSH; Future; Expected date: 03/14/2024

## 2023-12-14 NOTE — ASSESSMENT & PLAN NOTE
-labs are reviewed all essentially normal  -patient is advised on importance of watching her carbohydrate intake and saturated fat intake, making the right nutritional choices and exercising on a regular basis  -up-to-date with the screening

## 2023-12-14 NOTE — ASSESSMENT & PLAN NOTE

## 2024-03-05 ENCOUNTER — HOSPITAL ENCOUNTER (OUTPATIENT)
Dept: RADIOLOGY | Facility: HOSPITAL | Age: 64
Discharge: HOME | End: 2024-03-05
Attending: NURSE PRACTITIONER
Payer: COMMERCIAL

## 2024-03-05 DIAGNOSIS — Z91.89 AT HIGH RISK FOR BREAST CANCER: ICD-10-CM

## 2024-03-05 DIAGNOSIS — N60.92 ATYPICAL DUCTAL HYPERPLASIA OF LEFT BREAST: ICD-10-CM

## 2024-03-05 PROCEDURE — 77063 BREAST TOMOSYNTHESIS BI: CPT | Mod: 26,,, | Performed by: RADIOLOGY

## 2024-03-05 PROCEDURE — 77067 SCR MAMMO BI INCL CAD: CPT | Mod: TC

## 2024-03-05 PROCEDURE — 77067 SCR MAMMO BI INCL CAD: CPT | Mod: 26,,, | Performed by: RADIOLOGY

## 2024-03-12 ENCOUNTER — OFFICE VISIT (OUTPATIENT)
Dept: HEMATOLOGY/ONCOLOGY | Facility: CLINIC | Age: 64
End: 2024-03-12
Payer: COMMERCIAL

## 2024-03-12 ENCOUNTER — LAB VISIT (OUTPATIENT)
Dept: LAB | Facility: HOSPITAL | Age: 64
End: 2024-03-12
Attending: NURSE PRACTITIONER
Payer: COMMERCIAL

## 2024-03-12 VITALS
DIASTOLIC BLOOD PRESSURE: 77 MMHG | HEIGHT: 60 IN | RESPIRATION RATE: 18 BRPM | SYSTOLIC BLOOD PRESSURE: 134 MMHG | OXYGEN SATURATION: 99 % | HEART RATE: 59 BPM | WEIGHT: 186.38 LBS | BODY MASS INDEX: 36.59 KG/M2 | TEMPERATURE: 98 F

## 2024-03-12 DIAGNOSIS — N60.92 ATYPICAL DUCTAL HYPERPLASIA OF LEFT BREAST: Primary | ICD-10-CM

## 2024-03-12 DIAGNOSIS — Z91.89 AT HIGH RISK FOR BREAST CANCER: ICD-10-CM

## 2024-03-12 DIAGNOSIS — N60.92 ATYPICAL DUCTAL HYPERPLASIA OF LEFT BREAST: ICD-10-CM

## 2024-03-12 LAB
ALBUMIN SERPL-MCNC: 4 G/DL (ref 3.4–4.8)
ALBUMIN/GLOB SERPL: 1.4 RATIO (ref 1.1–2)
ALP SERPL-CCNC: 70 UNIT/L (ref 40–150)
ALT SERPL-CCNC: 15 UNIT/L (ref 0–55)
AST SERPL-CCNC: 27 UNIT/L (ref 5–34)
BASOPHILS # BLD AUTO: 0.04 X10(3)/MCL
BASOPHILS NFR BLD AUTO: 0.4 %
BILIRUB SERPL-MCNC: 1.2 MG/DL
BUN SERPL-MCNC: 13.4 MG/DL (ref 9.8–20.1)
CALCIUM SERPL-MCNC: 9.5 MG/DL (ref 8.4–10.2)
CHLORIDE SERPL-SCNC: 105 MMOL/L (ref 98–107)
CO2 SERPL-SCNC: 26 MMOL/L (ref 23–31)
CREAT SERPL-MCNC: 0.85 MG/DL (ref 0.55–1.02)
EOSINOPHIL # BLD AUTO: 0.49 X10(3)/MCL (ref 0–0.9)
EOSINOPHIL NFR BLD AUTO: 4.4 %
ERYTHROCYTE [DISTWIDTH] IN BLOOD BY AUTOMATED COUNT: 14 % (ref 11.5–17)
GFR SERPLBLD CREATININE-BSD FMLA CKD-EPI: >60 MLS/MIN/1.73/M2
GLOBULIN SER-MCNC: 2.9 GM/DL (ref 2.4–3.5)
GLUCOSE SERPL-MCNC: 78 MG/DL (ref 82–115)
HCT VFR BLD AUTO: 37.1 % (ref 37–47)
HGB BLD-MCNC: 13.1 G/DL (ref 12–16)
IMM GRANULOCYTES # BLD AUTO: 0.01 X10(3)/MCL (ref 0–0.04)
IMM GRANULOCYTES NFR BLD AUTO: 0.1 %
LYMPHOCYTES # BLD AUTO: 2.83 X10(3)/MCL (ref 0.6–4.6)
LYMPHOCYTES NFR BLD AUTO: 25.4 %
MCH RBC QN AUTO: 28.7 PG (ref 27–31)
MCHC RBC AUTO-ENTMCNC: 35.3 G/DL (ref 33–36)
MCV RBC AUTO: 81.4 FL (ref 80–94)
MONOCYTES # BLD AUTO: 0.63 X10(3)/MCL (ref 0.1–1.3)
MONOCYTES NFR BLD AUTO: 5.7 %
NEUTROPHILS # BLD AUTO: 7.13 X10(3)/MCL (ref 2.1–9.2)
NEUTROPHILS NFR BLD AUTO: 64 %
PLATELET # BLD AUTO: 306 X10(3)/MCL (ref 130–400)
PMV BLD AUTO: 10.3 FL (ref 7.4–10.4)
POTASSIUM SERPL-SCNC: 3.9 MMOL/L (ref 3.5–5.1)
PROT SERPL-MCNC: 6.9 GM/DL (ref 5.8–7.6)
RBC # BLD AUTO: 4.56 X10(6)/MCL (ref 4.2–5.4)
SODIUM SERPL-SCNC: 139 MMOL/L (ref 136–145)
WBC # SPEC AUTO: 11.13 X10(3)/MCL (ref 4.5–11.5)

## 2024-03-12 PROCEDURE — 36415 COLL VENOUS BLD VENIPUNCTURE: CPT

## 2024-03-12 PROCEDURE — 99999 PR PBB SHADOW E&M-EST. PATIENT-LVL IV: CPT | Mod: PBBFAC,,, | Performed by: NURSE PRACTITIONER

## 2024-03-12 PROCEDURE — 99214 OFFICE O/P EST MOD 30 MIN: CPT | Mod: S$GLB,,, | Performed by: NURSE PRACTITIONER

## 2024-03-12 PROCEDURE — 3078F DIAST BP <80 MM HG: CPT | Mod: CPTII,S$GLB,, | Performed by: NURSE PRACTITIONER

## 2024-03-12 PROCEDURE — 3075F SYST BP GE 130 - 139MM HG: CPT | Mod: CPTII,S$GLB,, | Performed by: NURSE PRACTITIONER

## 2024-03-12 PROCEDURE — 80053 COMPREHEN METABOLIC PANEL: CPT

## 2024-03-12 PROCEDURE — 85025 COMPLETE CBC W/AUTO DIFF WBC: CPT

## 2024-03-12 PROCEDURE — 1159F MED LIST DOCD IN RCRD: CPT | Mod: CPTII,S$GLB,, | Performed by: NURSE PRACTITIONER

## 2024-03-12 PROCEDURE — 3008F BODY MASS INDEX DOCD: CPT | Mod: CPTII,S$GLB,, | Performed by: NURSE PRACTITIONER

## 2024-03-12 PROCEDURE — 1160F RVW MEDS BY RX/DR IN RCRD: CPT | Mod: CPTII,S$GLB,, | Performed by: NURSE PRACTITIONER

## 2024-03-12 RX ORDER — FERROUS SULFATE 325(65) MG
325 TABLET, DELAYED RELEASE (ENTERIC COATED) ORAL DAILY
COMMUNITY

## 2024-03-12 RX ORDER — LANOLIN ALCOHOL/MO/W.PET/CERES
100 CREAM (GRAM) TOPICAL DAILY
COMMUNITY

## 2024-03-18 PROBLEM — Z00.00 WELLNESS EXAMINATION: Chronic | Status: RESOLVED | Noted: 2022-08-15 | Resolved: 2024-03-18

## 2024-03-28 ENCOUNTER — INFUSION (OUTPATIENT)
Dept: INFUSION THERAPY | Facility: HOSPITAL | Age: 64
End: 2024-03-28
Attending: NURSE PRACTITIONER
Payer: COMMERCIAL

## 2024-03-28 ENCOUNTER — HOSPITAL ENCOUNTER (OUTPATIENT)
Dept: RADIOLOGY | Facility: HOSPITAL | Age: 64
Discharge: HOME | End: 2024-03-28
Attending: NURSE PRACTITIONER
Payer: COMMERCIAL

## 2024-03-28 VITALS — DIASTOLIC BLOOD PRESSURE: 74 MMHG | HEART RATE: 71 BPM | SYSTOLIC BLOOD PRESSURE: 139 MMHG | TEMPERATURE: 99 F

## 2024-03-28 VITALS — WEIGHT: 197 LBS | HEIGHT: 60 IN | BODY MASS INDEX: 38.68 KG/M2

## 2024-03-28 DIAGNOSIS — R92.8 ABNORMAL MAMMOGRAM: ICD-10-CM

## 2024-03-28 DIAGNOSIS — M85.80 OSTEOPENIA DUE TO CANCER THERAPY: Primary | ICD-10-CM

## 2024-03-28 PROCEDURE — 76642 ULTRASOUND BREAST LIMITED: CPT | Mod: 26,RT,, | Performed by: RADIOLOGY

## 2024-03-28 PROCEDURE — 63600175 PHARM REV CODE 636 W HCPCS: Mod: JZ,JG | Performed by: NURSE PRACTITIONER

## 2024-03-28 PROCEDURE — 76642 ULTRASOUND BREAST LIMITED: CPT | Mod: TC,RT

## 2024-03-28 PROCEDURE — 77061 BREAST TOMOSYNTHESIS UNI: CPT | Mod: 26,RT,, | Performed by: RADIOLOGY

## 2024-03-28 PROCEDURE — 77065 DX MAMMO INCL CAD UNI: CPT | Mod: 26,RT,, | Performed by: RADIOLOGY

## 2024-03-28 PROCEDURE — 96372 THER/PROPH/DIAG INJ SC/IM: CPT

## 2024-03-28 PROCEDURE — 77065 DX MAMMO INCL CAD UNI: CPT | Mod: TC,RT

## 2024-03-28 PROCEDURE — 77061 BREAST TOMOSYNTHESIS UNI: CPT | Mod: TC,RT

## 2024-03-28 RX ADMIN — DENOSUMAB 60 MG: 60 INJECTION SUBCUTANEOUS at 04:03

## 2024-03-28 NOTE — NURSING
Patient arrived ambulatory to infusion alert and oriented with no complaints at this time.  Pt is here for prolia injection, given in LUE.  Pt tolerated injection well.    Vera Puente RN

## 2024-04-04 ENCOUNTER — OFFICE VISIT (OUTPATIENT)
Dept: HEMATOLOGY/ONCOLOGY | Facility: CLINIC | Age: 64
End: 2024-04-04
Payer: COMMERCIAL

## 2024-04-04 DIAGNOSIS — N60.92 ATYPICAL DUCTAL HYPERPLASIA OF LEFT BREAST: Primary | ICD-10-CM

## 2024-04-04 DIAGNOSIS — Z91.89 AT HIGH RISK FOR BREAST CANCER: ICD-10-CM

## 2024-04-04 PROCEDURE — 1159F MED LIST DOCD IN RCRD: CPT | Mod: CPTII,95,, | Performed by: INTERNAL MEDICINE

## 2024-04-04 PROCEDURE — 1160F RVW MEDS BY RX/DR IN RCRD: CPT | Mod: CPTII,95,, | Performed by: INTERNAL MEDICINE

## 2024-04-04 PROCEDURE — 99214 OFFICE O/P EST MOD 30 MIN: CPT | Mod: 95,,, | Performed by: INTERNAL MEDICINE

## 2024-04-04 NOTE — PROGRESS NOTES
HEMATOLOGY/ONCOLOGY OFFICE CLINIC VISIT    Visit Information:      This is a telemedicine note.  Patient was treated using telemedicine, real-time audio and video, according to [facility] protocols.      I, Dr. Shira Morrell, distant provided, conducted the visit from location identified below.  The patient participated in the visit at a non-[facility] location selected by the patient (or patient's representative), identified below.  I am licensed in the [facility] where the patient stated they are located.  The patient, (or patient's representative) stated that they understood and accepted privacy and security risk to the information and her location.   I have reviewed the patient name and date of birth  I have verbally reviewed the past medical history, active medication list, and allergies with the patient (parent/ legal guardian for a patient under the age of 18 years old) and verified with picture ID if applicable . I have verified that this patient is a resident in the [facility].  I have verbally obtained review of systems    Patient was located in the [facility]  I, Dr. Shira Morrell, distant provider, was located at [facility] .    Face-to-face time spent with the patient exceeds 25 minutes, over 50% of which was used for education and counseling regarding medical conditions, current medications including risk/benefits and side effects/adverse events, over-the-counter medications uses/doses, home self-care and contact precautions, red flags and indication for immediate medical attention.  The patient is receptive, expresses understanding and is agreeable to the plan.  All questions answered.    Shira Morrell MD      Initial Consultation: 7/9/2019  Referring Physician: Dr. Cook  Other Physicians:  Code Status:    Diagnosis/Problem list:   Atypical ductal hyperplasia of the left breast--Dx 4/2019    Present Treatment:  Close surveillance for high-risk breast cancer patient with  mammogram Q year alternating with MRI of the breast Q year    Treatment history:    Left breast excisional biopsy on 6/14/2019  Femara 2.5 mg daily x 5 yrs completed 3/2024      Imaging:  Screening mammogram 4/18/2019 was read as BI-RADS Category 0  Diagnostic Sammy left mammogram and ultrasound 5/13/2019, left breast 9 o'clock position 3 cm from the nipple mass and suspicious.  BI-RADS Category 4. Two additional masses in the left breast seen sonographically favored to represent complicated cysts.  BI-RADS Category 3: Probably benign.  Left breast 12 o'clock position 3 cm from the nipple complicated cyst, benign, BI-RADS Category 2.  Bone density 7/11/2019-normal  MRI breast 11/27/19: Post surgical change of the 9:00 left breast related to recent excisional biopsy.  Multiple benign cysts throughout both breasts. MRI BI-RADS: 2 Benign  BILATERAL DIGITAL DIAGNOSTIC MAMMOGRAM 3D/2D WITH CAD AND TARGETED LEFT ULTRASOUND: 2/11/2020  IMPRESSION: BENIGN, ULTRASOUND PROBABLY BENIGN   1) Left breast 9:00 axis periareolar position fat necrosis at the site of prior surgical excisional biopsy correlates with the patient's area of palpable concern and is benign. BI-RADS 2: benign.  2) Left breast 11:00 axis 3 cm FN mass is stable over 9 months of sonographic follow-up and is still considered probably benign, most likely a complicated cyst. BI-RADS 3: probably benign.   3) Left breast 10:00 axis 4 cm FN simple cyst is benign. BI-RADS 2: benign.  4) No mammographic evidence of malignancy in either breast.  RECOMMENDATIONS:  Recommend continued follow-up of the left breast 11:00 axis 3 cm FN mass per the standard BI-RADS 3 protocol.    US left breast 5/14/2020: No sonographic evidence of malignancy is identified in the 11:00 left breast. Simple cyst in the 11:00 left breast is benign. Ultrasound BI-RADS: 2 Benign   MRI of the breast 11/17/2020 with no suspicious enhancement in either breast to suggest malignancy.  Routine screening  mammography in February 2021 is recommended with additional consideration of continue on a supplement of breast cancer screening with dynamic contrast-enhanced breast MRI ideally 6 months following mammogram in this high risk patient with a lifetime risk of 35.1%.  MRI of the breast 12/12/2022:   MMG 2/23/2023: -RADS: 2 Benign. Continued annual mammography and supplemental breast screening with dynamic contrast enhanced breast MRI is suggested for this high-risk patient, preferably alternating mammography and MRI every 6 months.  - Routine annual screening mammography is due in 2 months unless clinical signs or symptoms warrant earlier evaluation (February 2023).  D MMG/US RIGHT 3/28/2024: The possible architectural distortion seen in the right breast medial region middle depth in the CC projection on recent screening mammogram effaced into normal-appearing fibroglandular tissue on today's spot compression tomosynthesis images.  Targeted ultrasound evaluation No suspicious sonographic abnormality is evident in the medial right breast, including within the area of concern on recent screening mammogram.  There are several benign cysts of varying size and complication in the medial right breast, including a 1.9 cm x 0.6 cm x 1.7 cm simple cyst in the 12:00 right breast 4 cm from the nipple, a 0.6 cm x 0.4 cm x 0.4 cm complicated cyst in the 3:00 periareolar right breast, and a 0.6 cm x 0.2 cm x 0.4 cm complicated cyst in the 4:00 periareolar right breast.  IMPRESSION: BENIGN  1.  No mammographic or sonographic evidence of malignancy in the imaged right breast.    2.  Several benign cysts of varying size and complication in the medial right breast, the largest in the 12:00 right breast 4 cm from the nipple measuring 1.9 cm.  RECOMMENDATIONS:   Return to annual screening mammography is recommended unless clinical signs or symptoms warrant earlier evaluation (March 2025).     Continued annual supplemental breast cancer  screening with dynamic contrast enhanced breast MRI is also recommended for this high-risk patient with a lifetime risk of breast cancer of 34.6% based on the Tyrer-Cuzick risk assessment model, preferably alternating mammography and MRI every 6 months (September 2024).     Pathology:    CLINICAL HISTORY:       Patient: 61 y/o female kindly referred by Dr. Cook for atypical ductal hyperplasia. Screening mammogram performed on 4/18/2019 was read as BI-RADS Category 0 further imaging left diagnostic mammography with tomosynthesis was recommended to evaluate annual asymmetry in the left breast posterior depth central to the nipple on the cranial caudal view.  On 5/13/2019, diagnostic Sammy left mammogram and ultrasound show a left breast 9 o'clock position 3 cm from the nipple mass and suspicious.  BI-RADS Category 4.  2 additional masses in the left breast seen sonographically favored to represent complicated cysts.  BI-RADS Category 3: Probably benign.  Left breast 12 o'clock position 3 cm from the nipple complicated cyst, benign, BI-RADS Category 2.  Ultrasound-guided biopsy of the left breast at the 9 o'clock position was performed on 5/24/2019.  Pathology report revealed a typical papilloma, papilloma with atypical ductal hyperplasia).  An intracystic papillary carcinoma/ductal carcinoma in situ cannot be rule out.  Surgical consultation was recommended.    Left breast excisional biopsy on 6/14/2019 showed focal residual a typical papilloma.  No evidence of invasive carcinoma.  Patient was then referred for chemoprophylaxis.    Patient does not have history of breast or ovarian cancer.  There is lung and throat cancer in her family.  She took birth control pills of unknown when younger.  Partial hysterectomy and unilateral oophorectomy in her mid 30s.      Chief Complaint: virtual      Interval History:  Virtual visit in follow-up of atypical ductal hyperplasia. Recent screening mammogram on 3/6/24 showed  abnormality so she is scheduled for diagnostic right mammogram and US on 3/28/24. She will complete 5 years of endocrine therapy ( for chemoprophylaxis )   MMG was read Benign (see above). Will continue routine followup for high risk breast cancer patient    ROS:All 14 points ROS taken and positive as per Interval History, all other negative.  Review of Systems   Constitutional:  Negative for chills, fever, malaise/fatigue and weight loss.   HENT:  Negative for congestion and nosebleeds.    Eyes:  Negative for blurred vision, double vision and photophobia.   Respiratory:  Negative for cough, hemoptysis and shortness of breath.    Cardiovascular:  Negative for chest pain, palpitations, leg swelling and PND.   Gastrointestinal:  Negative for abdominal pain, blood in stool, constipation, diarrhea, melena, nausea and vomiting.   Genitourinary:  Negative for dysuria, frequency, hematuria and urgency.   Musculoskeletal:  Negative for back pain, falls and myalgias.   Skin:  Negative for itching and rash.   Neurological:  Negative for tremors, focal weakness, seizures, weakness and headaches.   Endo/Heme/Allergies:  Negative for environmental allergies. Does not bruise/bleed easily.   Psychiatric/Behavioral:  Negative for depression and suicidal ideas. The patient is not nervous/anxious.    Answers submitted by the patient for this visit:  Review of Systems Questionnaire (Submitted on 4/4/2024)  appetite change : No  unexpected weight change: No  mouth sores: No  visual disturbance: No  adenopathy: No      Histories:  PMH/PSH/FH/SOCIAL/ALLERGIES AND MEDS REVIEWED AND UPDATED AS APPROPRIATE       There were no vitals filed for this visit.         Physical Exam  Vitals reviewed: LIMITED DUE TO TELEMEDICINE RESTRICTIONS..   Constitutional:       Appearance: Normal appearance.   HENT:      Head: Normocephalic.   Eyes:      Extraocular Movements: Extraocular movements intact.   Pulmonary:      Effort: Pulmonary effort is normal.    Musculoskeletal:      Cervical back: Neck supple.   Neurological:      Mental Status: She is alert.   Psychiatric:         Mood and Affect: Mood normal.         Behavior: Behavior normal.         Thought Content: Thought content normal.         Judgment: Judgment normal.       ECOG SCORE    0 - Fully active-able to carry on all pre-disease performance without restriction         Laboratory:  CBC with Differential:  Lab Results   Component Value Date    WBC 11.13 03/12/2024    RBC 4.56 03/12/2024    HGB 13.1 03/12/2024    HCT 37.1 03/12/2024    MCV 81.4 03/12/2024    MCH 28.7 03/12/2024    MCHC 35.3 03/12/2024    RDW 14.0 03/12/2024     03/12/2024    MPV 10.3 03/12/2024        CMP:  Sodium Level   Date Value Ref Range Status   03/12/2024 139 136 - 145 mmol/L Final     Potassium Level   Date Value Ref Range Status   03/12/2024 3.9 3.5 - 5.1 mmol/L Final     Carbon Dioxide   Date Value Ref Range Status   03/12/2024 26 23 - 31 mmol/L Final     Blood Urea Nitrogen   Date Value Ref Range Status   03/12/2024 13.4 9.8 - 20.1 mg/dL Final     Creatinine   Date Value Ref Range Status   03/12/2024 0.85 0.55 - 1.02 mg/dL Final     Calcium Level Total   Date Value Ref Range Status   03/12/2024 9.5 8.4 - 10.2 mg/dL Final     Albumin Level   Date Value Ref Range Status   03/12/2024 4.0 3.4 - 4.8 g/dL Final     Bilirubin Total   Date Value Ref Range Status   03/12/2024 1.2 <=1.5 mg/dL Final     Alkaline Phosphatase   Date Value Ref Range Status   03/12/2024 70 40 - 150 unit/L Final     Aspartate Aminotransferase   Date Value Ref Range Status   03/12/2024 27 5 - 34 unit/L Final     Alanine Aminotransferase   Date Value Ref Range Status   03/12/2024 15 0 - 55 unit/L Final     Estimated GFR-Non    Date Value Ref Range Status   02/10/2022 >60           Assessment:       1. Atypical ductal hyperplasia of left breast    2. At high risk for breast cancer        1) Atypical ductal hyperplasia of the left breast--  marker or risk factor for developing breast cancer in the future. Women with atypical hyperplasia have a lifetime risk of breast cancer that is about four times higher than that of women who don't have atypical hyperplasia: At 5 years after diagnosis, about 7 percent of women with atypical hyperplasia may develop breast cancer.  At 10 years after diagnosis, about 13 percent of women with atypical hyperplasia may develop breast cancer. At 25 years after diagnosis, about 30 percent of women with atypical hyperplasia may develop breast cancer.-- Treatment with medications such as tamoxifen or raloxifene (Evista), or aromatase inhibitors, such as exemestane (Aromasin) and anastrozole (Arimidex) for five years may reduce the risk of breast cancer.  Discussed risks vs benefits as well as toxicities associated with these medications. She was started on Aromasin--too expensive, now on Femara  Patient with an elevated lifetime risk of developing breast cancer (35.1% according to the Tyrer-Cuzick model). Will cont mammograms alternating with Breast MRI's  2) Hot flashes due to AI's  3) Appointment with Dr. Caputo for genetic testing--did not meet criteria for testing  4) Bone density 7/11/2019 was normal it will be repeated q 2 yrs.  If osteopenia or osteoporosis then she will be started on Prolia      Plan:       Completed 5 yrs AI 3/2024.  We will continue Close surveillance for high-risk breast cancer patient with mammogram Q year alternating with MRI of the breast Q year    MRI breast due 9/2024 -order placed  Screening mammogram due March 2025--will order next visit  Next prolia is due on 9/24.   RTC 5 months after MRI breast with no labs (labs through PCP)    Encouraged to call for any questions or problems  The patient was given ample opportunity to ask questions and they were all answered to satisfaction; patient demonstrated understanding of what we discussed and is agreeable to the plan.     Shira Morrell,  MD  Hematology/Oncology  [facility]

## 2024-04-11 ENCOUNTER — OFFICE VISIT (OUTPATIENT)
Dept: INTERNAL MEDICINE | Facility: CLINIC | Age: 64
End: 2024-04-11
Payer: COMMERCIAL

## 2024-04-11 VITALS
OXYGEN SATURATION: 97 % | TEMPERATURE: 100 F | BODY MASS INDEX: 35.58 KG/M2 | RESPIRATION RATE: 18 BRPM | HEART RATE: 102 BPM | WEIGHT: 182.19 LBS | DIASTOLIC BLOOD PRESSURE: 88 MMHG | SYSTOLIC BLOOD PRESSURE: 134 MMHG

## 2024-04-11 DIAGNOSIS — J06.9 UPPER RESPIRATORY TRACT INFECTION, UNSPECIFIED TYPE: Primary | ICD-10-CM

## 2024-04-11 DIAGNOSIS — J03.90 ACUTE SUPPURATIVE TONSILLITIS: ICD-10-CM

## 2024-04-11 DIAGNOSIS — J02.9 SORE THROAT: ICD-10-CM

## 2024-04-11 LAB
CTP QC/QA: YES
FLUAV AG UPPER RESP QL IA.RAPID: DETECTED
FLUBV AG UPPER RESP QL IA.RAPID: NOT DETECTED
MOLECULAR STREP A: NEGATIVE
RSV A 5' UTR RNA NPH QL NAA+PROBE: NOT DETECTED
SARS-COV-2 RNA RESP QL NAA+PROBE: NOT DETECTED

## 2024-04-11 PROCEDURE — 0241U COVID/RSV/FLU A&B PCR: CPT

## 2024-04-11 PROCEDURE — 3008F BODY MASS INDEX DOCD: CPT | Mod: CPTII,,,

## 2024-04-11 PROCEDURE — 1159F MED LIST DOCD IN RCRD: CPT | Mod: CPTII,,,

## 2024-04-11 PROCEDURE — 99214 OFFICE O/P EST MOD 30 MIN: CPT | Mod: 25,,,

## 2024-04-11 PROCEDURE — 3079F DIAST BP 80-89 MM HG: CPT | Mod: CPTII,,,

## 2024-04-11 PROCEDURE — 87651 STREP A DNA AMP PROBE: CPT | Mod: QW,,,

## 2024-04-11 PROCEDURE — 3075F SYST BP GE 130 - 139MM HG: CPT | Mod: CPTII,,,

## 2024-04-11 PROCEDURE — 1160F RVW MEDS BY RX/DR IN RCRD: CPT | Mod: CPTII,,,

## 2024-04-11 RX ORDER — AZITHROMYCIN 250 MG/1
TABLET, FILM COATED ORAL
Qty: 6 TABLET | Refills: 0 | Status: SHIPPED | OUTPATIENT
Start: 2024-04-11 | End: 2024-06-18

## 2024-04-11 RX ORDER — DEXAMETHASONE SODIUM PHOSPHATE 4 MG/ML
4 INJECTION, SOLUTION INTRA-ARTICULAR; INTRALESIONAL; INTRAMUSCULAR; INTRAVENOUS; SOFT TISSUE
Status: COMPLETED | OUTPATIENT
Start: 2024-04-11 | End: 2024-04-12

## 2024-04-11 NOTE — PROGRESS NOTES
Patient ID: Shahnaz Chirinos is a 64 y.o. female.    Chief Complaint: Cough (Cough,sore throat, fever, headaches, body aches )    63-year-old female here today for  a requested visit. Medical comorbidities include hypothyroidism, vitamin D deficiency, GERD, SIRISHA, DDD and atypical ductal hyperplasia of her left breast for which she is currently on Femara after excision of her breast lesion.      Today presents complaints of new URI symptoms and painful sore throat.    Wellness: 2023      URI   This is a new problem. The current episode started in the past 7 days. The problem has been gradually worsening. The maximum temperature recorded prior to her arrival was 100.4 - 100.9 F. The fever has been present for Less than 1 day. Associated symptoms include congestion, coughing, headaches, rhinorrhea and a sore throat. Pertinent negatives include no abdominal pain, chest pain, diarrhea, dysuria, nausea, rash, vomiting or wheezing. She has tried NSAIDs and acetaminophen for the symptoms. The treatment provided mild relief.       MEDICAL HISTORY:    Past Medical History:   Diagnosis Date    Abdominal pain     Achilles tendonitis     Acquired hypothyroidism     Acute sinusitis     Cholelithiasis     DCIS (ductal carcinoma in situ)     Gastric reflux     Malaise and fatigue     Osteopenia     Vitamin D deficiency       Past Surgical History:   Procedure Laterality Date    BREAST SURGERY  2019     SECTION      CHOLECYSTECTOMY      EXC. OF BREAST LESION ID BY RADIOLOGICAL Left     KNEE SURGERY      LAPAROSCOPIC CHOLECYSTECTOMY      TOTAL ABDOMINAL HYSTERECTOMY      Per [facility]    TUBAL LIGATION        Social History     Tobacco Use    Smoking status: Never    Smokeless tobacco: Never    Tobacco comments:     Never   Substance Use Topics    Alcohol use: Yes    Drug use: Never          Health Maintenance Due   Topic Date Due    Hepatitis C Screening  Never done    TETANUS VACCINE  10/15/2007     RSV Vaccine (Age 60+ and Pregnant patients) (1 - 1-dose 60+ series) Never done    COVID-19 Vaccine (3 - 2023-24 season) 09/01/2023          Patient Care Team:  Maia Oakes MD as PCP - General (Internal Medicine)  Panelli, Shira SMITH MD as Consulting Physician (Hematology and Oncology)      Review of Systems   Constitutional:  Positive for fatigue and fever.   HENT:  Positive for congestion, rhinorrhea and sore throat. Negative for trouble swallowing.    Eyes:  Negative for redness and visual disturbance.   Respiratory:  Positive for cough. Negative for chest tightness, shortness of breath and wheezing.    Cardiovascular:  Negative for chest pain and palpitations.   Gastrointestinal:  Negative for abdominal pain, constipation, diarrhea, nausea and vomiting.   Genitourinary:  Negative for dysuria, flank pain, frequency and urgency.   Musculoskeletal:  Negative for arthralgias, gait problem and myalgias.   Skin:  Negative for rash and wound.   Neurological:  Positive for headaches. Negative for facial asymmetry, speech difficulty and weakness.   All other systems reviewed and are negative.      Objective:   /88 (BP Location: Left arm, Patient Position: Sitting, BP Method: Large (Automatic))   Pulse 102   Temp 100.3 °F (37.9 °C) (Temporal)   Resp 18   Wt 82.6 kg (182 lb 3.2 oz)   SpO2 97%   BMI 35.58 kg/m²      Physical Exam  Constitutional:       General: She is not in acute distress.     Appearance: Normal appearance.   HENT:      Right Ear: Tympanic membrane, ear canal and external ear normal.      Left Ear: Tympanic membrane, ear canal and external ear normal.      Nose: Nose normal.      Mouth/Throat:      Mouth: Mucous membranes are moist.      Pharynx: Pharyngeal swelling, oropharyngeal exudate and posterior oropharyngeal erythema present.   Eyes:      Extraocular Movements: Extraocular movements intact.      Conjunctiva/sclera: Conjunctivae normal.      Pupils: Pupils are equal, round, and  reactive to light.   Cardiovascular:      Rate and Rhythm: Normal rate and regular rhythm.      Pulses: Normal pulses.      Heart sounds: Normal heart sounds. No murmur heard.     No gallop.   Pulmonary:      Effort: Pulmonary effort is normal.      Breath sounds: Normal breath sounds. No wheezing.   Abdominal:      General: Bowel sounds are normal. There is no distension.      Palpations: Abdomen is soft. There is no mass.      Tenderness: There is no abdominal tenderness. There is no guarding.   Musculoskeletal:         General: Normal range of motion.   Skin:     General: Skin is warm and dry.   Neurological:      Mental Status: She is alert. Mental status is at baseline.      Sensory: No sensory deficit.      Motor: No weakness.           Assessment:       ICD-10-CM ICD-9-CM   1. Upper respiratory tract infection, unspecified type  J06.9 465.9   2. Acute suppurative tonsillitis  J03.90 463   3. Sore throat  J02.9 462        Plan:     Problem List Items Addressed This Visit          ENT    Upper respiratory tract infection - Primary     -obtain COVID/RSV/FLU PCR  -Encourage daily Antihistamine  -Okay to utilize Flonase BID  -Steam inhalation  -Tylenol/ibuprofen for body aches and low-grade temps  -OTC cough syrup as indicated  -okay to utilize Vit C , Vit D, and Zinc  -safety precautions/quarantine discussed   -notify clinic for any new or worsening symptoms          Acute suppurative tonsillitis     -in office rapid strep:  Negative   -visual pus during exam, initiate azithromycin         Relevant Medications    azithromycin (Z-DAVIAN) 250 MG tablet    dexAMETHasone injection 4 mg    Other Relevant Orders    COVID/RSV/FLU A&B PCR (Completed)    POCT Strep A, Molecular (Completed)     Other Visit Diagnoses       Sore throat        Relevant Medications    azithromycin (Z-DAVIAN) 250 MG tablet    dexAMETHasone injection 4 mg    Other Relevant Orders    COVID/RSV/FLU A&B PCR (Completed)    POCT Strep A, Molecular  (Completed)               Follow up for Previously scheduled and PRN if need.   -plan specifics discussed above    Orders Placed This Encounter    COVID/RSV/FLU A&B PCR    POCT Strep A, Molecular    azithromycin (Z-DAVIAN) 250 MG tablet    dexAMETHasone injection 4 mg        Medication List with Changes/Refills   New Medications    AZITHROMYCIN (Z-DAVIAN) 250 MG TABLET    Take 2 tablets by mouth on day 1; Take 1 tablet by mouth on days 2-5    BALOXAVIR MARBOXIL (XOFLUZA) 80 MG TABLET    Take 1 tablet (80 mg total) by mouth once. for 1 dose   Current Medications    ASPIRIN (ECOTRIN) 81 MG EC TABLET    Take 81 mg by mouth once daily.    CHOLECALCIFEROL, VITAMIN D3, 125 MCG (5,000 UNIT) TAB    Take 5,000 Units by mouth once daily.    CYANOCOBALAMIN (VITAMIN B-12) 1000 MCG TABLET    Take 100 mcg by mouth once daily.    DENOSUMAB (PROLIA) 60 MG/ML SYRG    Inject 60 mg into the skin every 6 (six) months.    FAMOTIDINE (PEPCID) 20 MG TABLET    Take 20 mg by mouth daily as needed.    FERROUS SULFATE 325 (65 FE) MG EC TABLET    Take 325 mg by mouth once daily.    LETROZOLE (FEMARA) 2.5 MG TAB    TAKE 1 TABLET BY MOUTH EVERY DAY    LEVOTHYROXINE (SYNTHROID) 75 MCG TABLET    Take 1 tablet (75 mcg total) by mouth before breakfast.

## 2024-04-12 ENCOUNTER — TELEPHONE (OUTPATIENT)
Dept: INTERNAL MEDICINE | Facility: CLINIC | Age: 64
End: 2024-04-12
Payer: COMMERCIAL

## 2024-04-12 DIAGNOSIS — J11.1 INFLUENZA: Primary | ICD-10-CM

## 2024-04-12 PROBLEM — J03.90: Status: ACTIVE | Noted: 2024-04-12

## 2024-04-12 PROBLEM — J06.9 UPPER RESPIRATORY TRACT INFECTION: Status: ACTIVE | Noted: 2024-04-12

## 2024-04-12 PROCEDURE — 96372 THER/PROPH/DIAG INJ SC/IM: CPT | Mod: ,,,

## 2024-04-12 RX ORDER — BALOXAVIR MARBOXIL 80 MG/1
80 TABLET, FILM COATED ORAL ONCE
Qty: 1 TABLET | Refills: 0 | Status: SHIPPED | OUTPATIENT
Start: 2024-04-12 | End: 2024-04-12

## 2024-04-12 RX ADMIN — DEXAMETHASONE SODIUM PHOSPHATE 4 MG: 4 INJECTION, SOLUTION INTRA-ARTICULAR; INTRALESIONAL; INTRAMUSCULAR; INTRAVENOUS; SOFT TISSUE at 11:04

## 2024-06-18 ENCOUNTER — LAB VISIT (OUTPATIENT)
Dept: LAB | Facility: HOSPITAL | Age: 64
End: 2024-06-18
Attending: INTERNAL MEDICINE
Payer: COMMERCIAL

## 2024-06-18 ENCOUNTER — OFFICE VISIT (OUTPATIENT)
Dept: INTERNAL MEDICINE | Facility: CLINIC | Age: 64
End: 2024-06-18
Payer: COMMERCIAL

## 2024-06-18 VITALS
SYSTOLIC BLOOD PRESSURE: 132 MMHG | OXYGEN SATURATION: 98 % | BODY MASS INDEX: 36.32 KG/M2 | HEART RATE: 67 BPM | WEIGHT: 185 LBS | DIASTOLIC BLOOD PRESSURE: 74 MMHG | HEIGHT: 60 IN

## 2024-06-18 DIAGNOSIS — E03.9 ACQUIRED HYPOTHYROIDISM: ICD-10-CM

## 2024-06-18 DIAGNOSIS — R19.6 HALITOSIS: ICD-10-CM

## 2024-06-18 DIAGNOSIS — E66.01 SEVERE OBESITY (BMI 35.0-39.9) WITH COMORBIDITY: ICD-10-CM

## 2024-06-18 DIAGNOSIS — E03.9 ACQUIRED HYPOTHYROIDISM: Primary | ICD-10-CM

## 2024-06-18 DIAGNOSIS — G47.33 OSA (OBSTRUCTIVE SLEEP APNEA): ICD-10-CM

## 2024-06-18 LAB — TSH SERPL-ACNC: 1.4 UIU/ML (ref 0.35–4.94)

## 2024-06-18 PROCEDURE — 3008F BODY MASS INDEX DOCD: CPT | Mod: CPTII,,, | Performed by: INTERNAL MEDICINE

## 2024-06-18 PROCEDURE — 99214 OFFICE O/P EST MOD 30 MIN: CPT | Mod: ,,, | Performed by: INTERNAL MEDICINE

## 2024-06-18 PROCEDURE — 1159F MED LIST DOCD IN RCRD: CPT | Mod: CPTII,,, | Performed by: INTERNAL MEDICINE

## 2024-06-18 PROCEDURE — 84443 ASSAY THYROID STIM HORMONE: CPT

## 2024-06-18 PROCEDURE — 3075F SYST BP GE 130 - 139MM HG: CPT | Mod: CPTII,,, | Performed by: INTERNAL MEDICINE

## 2024-06-18 PROCEDURE — 36415 COLL VENOUS BLD VENIPUNCTURE: CPT

## 2024-06-18 PROCEDURE — 1160F RVW MEDS BY RX/DR IN RCRD: CPT | Mod: CPTII,,, | Performed by: INTERNAL MEDICINE

## 2024-06-18 PROCEDURE — 3078F DIAST BP <80 MM HG: CPT | Mod: CPTII,,, | Performed by: INTERNAL MEDICINE

## 2024-06-18 NOTE — ASSESSMENT & PLAN NOTE
Wears CPAP  and reports compliance nightly. Life time use expected.  Has experienced improved daytime fatigue.  Avoid excessive alcohol, smoking and overuse of sedatives at bedtime.  Weight management discussed. Goal BMI <30.  Adjust sleep position PRN.

## 2024-06-18 NOTE — ASSESSMENT & PLAN NOTE
Body mass index is 36.13 kg/m².  Goal BMI <30.  Exercise 5 times a week for 30 minutes per day.  Avoid soda, simple sugars, excessive rice, potatoes or bread. Limit fast foods and fried foods.  Choose complex carbs in moderation (example: green vegetables, beans, oatmeal). Eat plenty of fresh fruits and vegetables with lean meats daily.  Do not skip meals. Eat a balanced portion size.  Avoid fad diets. Consider permanent healthy life style changes.    -initiating patient on Contrave, instructions provided and prescription sent

## 2024-06-18 NOTE — PROGRESS NOTES
Subjective:      Patient ID: Shahnaz Chirinos is a 64 y.o. female.    Chief Complaint: Follow-up (6 month thyroid/)    Ms. Christie is a 63-year-old female here today for a six-month follow-up.  Medical comorbidities include hypothyroidism, vitamin D deficiency, GERD, DDD and atypical ductal hyperplasia of her left breast for which she completed Femara after excision of her breast lesion.   Also has obstructive sleep apnea .  Patient would like a referral to Gastroenterology, states she struggles with having a foul odor from the back of her throat.  She wants to make sure there is nothing else going on.  We did discuss on regular dental checks, flossing and dental hygiene.    Also would like something to help with weight loss, Contrave was discussed and patient was in agreement to try and was prescribed     Wellness: 2023  MM2022  CRS: 10/2021  DEXA: 2019  Pap: Hysterectomy    The patient's Health Maintenance was reviewed and the following appears to be due at this time:   Health Maintenance Due   Topic Date Due    Hepatitis C Screening  Never done    TETANUS VACCINE  10/15/2007    Shingles Vaccine (1 of 2) Never done    RSV Vaccine (Age 60+ and Pregnant patients) (1 - 1-dose 60+ series) Never done    COVID-19 Vaccine (3 - 2023- season) 2023        Past Medical History:  Past Medical History:   Diagnosis Date    Abdominal pain     Achilles tendonitis     Acquired hypothyroidism     Acute sinusitis     Cholelithiasis     DCIS (ductal carcinoma in situ)     Gastric reflux     Malaise and fatigue     Osteopenia     Vitamin D deficiency      Past Surgical History:   Procedure Laterality Date    BREAST SURGERY  2019     SECTION      CHOLECYSTECTOMY      EXC. OF BREAST LESION ID BY RADIOLOGICAL Left     KNEE SURGERY      LAPAROSCOPIC CHOLECYSTECTOMY      TOTAL ABDOMINAL HYSTERECTOMY      Per [facility]    TUBAL LIGATION       Review of patient's allergies indicates:  No Known  Allergies  Social History     Socioeconomic History    Marital status:    Tobacco Use    Smoking status: Never    Smokeless tobacco: Never    Tobacco comments:     Never   Substance and Sexual Activity    Alcohol use: Yes    Drug use: Never    Sexual activity: Not Currently     Partners: Male     Birth control/protection: None     Social Determinants of Health     Financial Resource Strain: Low Risk  (7/17/2023)    Overall Financial Resource Strain (CARDIA)     Difficulty of Paying Living Expenses: Not hard at all   Food Insecurity: No Food Insecurity (7/17/2023)    Hunger Vital Sign     Worried About Running Out of Food in the Last Year: Never true     Ran Out of Food in the Last Year: Never true   Transportation Needs: No Transportation Needs (7/17/2023)    PRAPARE - Transportation     Lack of Transportation (Medical): No     Lack of Transportation (Non-Medical): No   Physical Activity: Insufficiently Active (7/17/2023)    Exercise Vital Sign     Days of Exercise per Week: 3 days     Minutes of Exercise per Session: 30 min   Stress: No Stress Concern Present (7/17/2023)    [employer] - Occupational Stress Questionnaire     Feeling of Stress : Not at all   Housing Stability: Low Risk  (7/17/2023)    Housing Stability Vital Sign     Unable to Pay for Housing in the Last Year: No     Number of Places Lived in the Last Year: 2     Unstable Housing in the Last Year: No     Family History   Problem Relation Name Age of Onset    Heart failure Mother Caryn mijares     COPD Mother Caryn mijares     Cancer Father Claude Gallien     Diabetes Brother Trino mijares     Peripheral vascular disease Brother Trino mijares     Lung cancer Brother Trino mijares     Breast cancer Neg Hx          neg fam history of breast cancer       Review of Systems    A comprehensive review of systems was performed and is negative except for that stated above  Objective:   /74 (BP Location: Right arm,  Patient Position: Sitting, BP Method: Large (Manual))   Pulse 67   Ht 5' (1.524 m)   Wt 83.9 kg (185 lb)   SpO2 98%   BMI 36.13 kg/m²     Physical Exam  Constitutional:       Appearance: Normal appearance. She is obese.   HENT:      Head: Normocephalic and atraumatic.      Nose: Nose normal.      Mouth/Throat:      Mouth: Mucous membranes are moist.      Pharynx: Oropharynx is clear.   Eyes:      Extraocular Movements: Extraocular movements intact.      Pupils: Pupils are equal, round, and reactive to light.   Cardiovascular:      Rate and Rhythm: Normal rate and regular rhythm.      Pulses: Normal pulses.   Pulmonary:      Effort: Pulmonary effort is normal.      Breath sounds: Normal breath sounds.   Abdominal:      General: Bowel sounds are normal.      Palpations: Abdomen is soft.   Musculoskeletal:         General: Normal range of motion.      Cervical back: Normal range of motion and neck supple.   Skin:     General: Skin is warm.   Neurological:      General: No focal deficit present.      Mental Status: She is alert and oriented to person, place, and time. Mental status is at baseline.   Psychiatric:         Mood and Affect: Mood normal.       Assessment/ Plan:   1. Acquired hypothyroidism  Assessment & Plan:  -on levothyroxine 75 mcg p.o. daily, continue   -emphasized to take it on an empty stomach         2. SIRISHA (obstructive sleep apnea)  Assessment & Plan:  Wears CPAP  and reports compliance nightly. Life time use expected.  Has experienced improved daytime fatigue.  Avoid excessive alcohol, smoking and overuse of sedatives at bedtime.  Weight management discussed. Goal BMI <30.  Adjust sleep position PRN.       3. Severe obesity (BMI 35.0-39.9) with comorbidity  Assessment & Plan:  Body mass index is 36.13 kg/m².  Goal BMI <30.  Exercise 5 times a week for 30 minutes per day.  Avoid soda, simple sugars, excessive rice, potatoes or bread. Limit fast foods and fried foods.  Choose complex carbs in  moderation (example: green vegetables, beans, oatmeal). Eat plenty of fresh fruits and vegetables with lean meats daily.  Do not skip meals. Eat a balanced portion size.  Avoid fad diets. Consider permanent healthy life style changes.    -initiating patient on Contrave, instructions provided and prescription sent      4. Halitosis  Overview:  -patient requesting a GI referral, wants to have an endoscopic procedure, referral was sent and they can discuss on indications at the appointment  -emphasized and discussed in detail regarding oral hygiene and dental care

## 2024-09-04 ENCOUNTER — TELEPHONE (OUTPATIENT)
Dept: INTERNAL MEDICINE | Facility: CLINIC | Age: 64
End: 2024-09-04
Payer: COMMERCIAL

## 2024-09-04 DIAGNOSIS — E66.01 SEVERE OBESITY (BMI 35.0-39.9) WITH COMORBIDITY: ICD-10-CM

## 2024-09-04 DIAGNOSIS — G47.33 OSA (OBSTRUCTIVE SLEEP APNEA): ICD-10-CM

## 2024-09-04 NOTE — TELEPHONE ENCOUNTER
----- Message from Lisy Du sent at 9/4/2024  2:31 PM CDT -----  .Who Called: Shahnaz Chirinos        Preferred Method of Contact: Phone Call  Patient's Preferred Phone Number on File: [phone]   Best Call Back Number, if different:  Additional Information: naltrexone-bupropion (CONTRAVE) 8-90 mg TbSR pt asking to resend meds to [facility] on [address] please advice

## 2024-09-05 ENCOUNTER — TELEPHONE (OUTPATIENT)
Dept: INTERNAL MEDICINE | Facility: CLINIC | Age: 64
End: 2024-09-05
Payer: COMMERCIAL

## 2024-09-05 DIAGNOSIS — E03.9 ACQUIRED HYPOTHYROIDISM: Primary | ICD-10-CM

## 2024-09-10 ENCOUNTER — TELEPHONE (OUTPATIENT)
Dept: INTERNAL MEDICINE | Facility: CLINIC | Age: 64
End: 2024-09-10
Payer: COMMERCIAL

## 2024-09-18 ENCOUNTER — OFFICE VISIT (OUTPATIENT)
Dept: INTERNAL MEDICINE | Facility: CLINIC | Age: 64
End: 2024-09-18
Payer: COMMERCIAL

## 2024-09-18 ENCOUNTER — LAB VISIT (OUTPATIENT)
Dept: LAB | Facility: HOSPITAL | Age: 64
End: 2024-09-18
Payer: COMMERCIAL

## 2024-09-18 VITALS
BODY MASS INDEX: 36.71 KG/M2 | DIASTOLIC BLOOD PRESSURE: 76 MMHG | OXYGEN SATURATION: 98 % | WEIGHT: 187 LBS | SYSTOLIC BLOOD PRESSURE: 126 MMHG | HEIGHT: 60 IN | HEART RATE: 73 BPM

## 2024-09-18 DIAGNOSIS — E66.01 CLASS 2 SEVERE OBESITY DUE TO EXCESS CALORIES WITH SERIOUS COMORBIDITY AND BODY MASS INDEX (BMI) OF 36.0 TO 36.9 IN ADULT: Chronic | ICD-10-CM

## 2024-09-18 DIAGNOSIS — G47.33 OSA (OBSTRUCTIVE SLEEP APNEA): ICD-10-CM

## 2024-09-18 DIAGNOSIS — E03.9 ACQUIRED HYPOTHYROIDISM: Primary | ICD-10-CM

## 2024-09-18 DIAGNOSIS — R30.0 DYSURIA: ICD-10-CM

## 2024-09-18 DIAGNOSIS — E66.01 SEVERE OBESITY (BMI 35.0-39.9) WITH COMORBIDITY: ICD-10-CM

## 2024-09-18 DIAGNOSIS — E03.9 ACQUIRED HYPOTHYROIDISM: ICD-10-CM

## 2024-09-18 PROBLEM — J06.9 UPPER RESPIRATORY TRACT INFECTION: Status: RESOLVED | Noted: 2024-04-12 | Resolved: 2024-09-18

## 2024-09-18 PROBLEM — H66.91 OTITIS, RIGHT: Status: RESOLVED | Noted: 2022-09-27 | Resolved: 2024-09-18

## 2024-09-18 PROBLEM — E66.812 CLASS 2 SEVERE OBESITY DUE TO EXCESS CALORIES WITH SERIOUS COMORBIDITY AND BODY MASS INDEX (BMI) OF 36.0 TO 36.9 IN ADULT: Status: ACTIVE | Noted: 2022-08-15

## 2024-09-18 PROBLEM — J03.90: Status: RESOLVED | Noted: 2024-04-12 | Resolved: 2024-09-18

## 2024-09-18 PROBLEM — E66.812 CLASS 2 SEVERE OBESITY DUE TO EXCESS CALORIES WITH SERIOUS COMORBIDITY AND BODY MASS INDEX (BMI) OF 36.0 TO 36.9 IN ADULT: Chronic | Status: ACTIVE | Noted: 2022-08-15

## 2024-09-18 LAB
BACTERIA #/AREA URNS AUTO: ABNORMAL /HPF
BILIRUB UR QL STRIP.AUTO: NEGATIVE
CAOX CRY UR QL COMP ASSIST: ABNORMAL
CLARITY UR: CLEAR
COLOR UR AUTO: ABNORMAL
GLUCOSE UR QL STRIP: NORMAL
HGB UR QL STRIP: ABNORMAL
KETONES UR QL STRIP: NEGATIVE
LEUKOCYTE ESTERASE UR QL STRIP: NEGATIVE
NITRITE UR QL STRIP: NEGATIVE
PH UR STRIP: 6 [PH]
PROT UR QL STRIP: NEGATIVE
RBC #/AREA URNS AUTO: ABNORMAL /HPF
SP GR UR STRIP.AUTO: 1.02 (ref 1–1.03)
SQUAMOUS #/AREA URNS LPF: ABNORMAL /HPF
TSH SERPL-ACNC: 2.29 UIU/ML (ref 0.35–4.94)
UROBILINOGEN UR STRIP-ACNC: 2
WBC #/AREA URNS AUTO: ABNORMAL /HPF

## 2024-09-18 PROCEDURE — 1159F MED LIST DOCD IN RCRD: CPT | Mod: CPTII,,,

## 2024-09-18 PROCEDURE — 3078F DIAST BP <80 MM HG: CPT | Mod: CPTII,,,

## 2024-09-18 PROCEDURE — 1160F RVW MEDS BY RX/DR IN RCRD: CPT | Mod: CPTII,,,

## 2024-09-18 PROCEDURE — 36415 COLL VENOUS BLD VENIPUNCTURE: CPT

## 2024-09-18 PROCEDURE — 3008F BODY MASS INDEX DOCD: CPT | Mod: CPTII,,,

## 2024-09-18 PROCEDURE — 99214 OFFICE O/P EST MOD 30 MIN: CPT | Mod: ,,,

## 2024-09-18 PROCEDURE — 84443 ASSAY THYROID STIM HORMONE: CPT

## 2024-09-18 PROCEDURE — 3074F SYST BP LT 130 MM HG: CPT | Mod: CPTII,,,

## 2024-09-18 NOTE — PROGRESS NOTES
Patient ID: Shahnaz Chirinos is a 64 y.o. female.    Chief Complaint: Follow-up (Follow up Thyroid labs done. Patient thinks she may have a UTI, some burning with urination and urine is cloudy. Started a few days ago, denies any fever. )    63-year-old female here today for  a hypothyroidism follow-up visit. Medical comorbidities include hypothyroidism, vitamin D deficiency, GERD, SIRISHA, DDD and atypical ductal hyperplasia of her left breast for which she is currently on Femara after excision of her breast lesion.    Since last visit patient reports he has been fairly well without acute injury or major illness.  Does have some acute complaints of urinary burning and frequency.  Denies fevers, chills, pelvic pressure, or hematuria.  Most recent labs reviewed in generally stable.  TSH well-controlled currently on levothyroxine 75 mcg daily.  BMI 36.5, requesting prescription for Contrave.  Discussed sending to  mail-in pharmacy for possible better pricing, for which patient was in agreeance.  Otherwise stable for today's visit     Wellness: 2023          MEDICAL HISTORY:    Past Medical History:   Diagnosis Date    Abdominal pain     Achilles tendonitis     Acquired hypothyroidism     Acute sinusitis     Cholelithiasis     DCIS (ductal carcinoma in situ)     Gastric reflux     Malaise and fatigue     Osteopenia     Vitamin D deficiency       Past Surgical History:   Procedure Laterality Date    BREAST SURGERY  2019     SECTION      CHOLECYSTECTOMY      EXC. OF BREAST LESION ID BY RADIOLOGICAL Left     KNEE SURGERY      LAPAROSCOPIC CHOLECYSTECTOMY      TOTAL ABDOMINAL HYSTERECTOMY      Per [facility]    TUBAL LIGATION        Social History     Tobacco Use    Smoking status: Never    Smokeless tobacco: Never    Tobacco comments:     Never   Substance Use Topics    Alcohol use: Yes    Drug use: Never          Health Maintenance Due   Topic Date Due    Hepatitis C Screening   Never done    TETANUS VACCINE  10/15/2007    Shingles Vaccine (1 of 2) Never done    RSV Vaccine (Age 60+ and Pregnant patients) (1 - 1-dose 60+ series) Never done    Influenza Vaccine (1) 09/01/2024    COVID-19 Vaccine (3 - 2023-24 season) 09/01/2024          Patient Care Team:  Maia Oakes MD as PCP - General (Internal Medicine)  PanelShira ramírez MD as Consulting Physician (Hematology and Oncology)      Review of Systems   Constitutional:  Negative for fatigue and fever.   HENT:  Negative for congestion, rhinorrhea, sore throat and trouble swallowing.    Eyes:  Negative for redness and visual disturbance.   Respiratory:  Negative for cough, chest tightness and shortness of breath.    Cardiovascular:  Negative for chest pain and palpitations.   Gastrointestinal:  Negative for abdominal pain, constipation, diarrhea, nausea and vomiting.   Genitourinary:  Positive for dysuria and frequency. Negative for flank pain and urgency.   Musculoskeletal:  Negative for arthralgias, gait problem and myalgias.   Skin:  Negative for rash and wound.   Neurological:  Negative for facial asymmetry, speech difficulty, weakness and headaches.   All other systems reviewed and are negative.      Objective:   /76 (BP Location: Left arm)   Pulse 73   Ht 5' (1.524 m)   Wt 84.8 kg (187 lb)   SpO2 98%   BMI 36.52 kg/m²      Physical Exam  Constitutional:       General: She is not in acute distress.     Appearance: Normal appearance.   HENT:      Right Ear: Tympanic membrane, ear canal and external ear normal.      Left Ear: Tympanic membrane, ear canal and external ear normal.      Nose: Nose normal.      Mouth/Throat:      Mouth: Mucous membranes are moist.      Pharynx: Oropharynx is clear.   Eyes:      Extraocular Movements: Extraocular movements intact.      Conjunctiva/sclera: Conjunctivae normal.      Pupils: Pupils are equal, round, and reactive to light.   Cardiovascular:      Rate and Rhythm: Normal rate and  regular rhythm.      Pulses: Normal pulses.      Heart sounds: Normal heart sounds. No murmur heard.     No gallop.   Pulmonary:      Effort: Pulmonary effort is normal.      Breath sounds: Normal breath sounds. No wheezing.   Abdominal:      General: Bowel sounds are normal. There is no distension.      Palpations: Abdomen is soft. There is no mass.      Tenderness: There is no abdominal tenderness. There is no guarding.   Musculoskeletal:         General: Normal range of motion.   Skin:     General: Skin is warm and dry.   Neurological:      Mental Status: She is alert. Mental status is at baseline.      Sensory: No sensory deficit.      Motor: No weakness.           Assessment:       ICD-10-CM ICD-9-CM   1. Acquired hypothyroidism  E03.9 244.9   2. SIRISHA (obstructive sleep apnea)  G47.33 327.23   3. Dysuria  R30.0 788.1   4. Severe obesity (BMI 35.0-39.9) with comorbidity  E66.01 278.01   5. Class 2 severe obesity due to excess calories with serious comorbidity and body mass index (BMI) of 36.0 to 36.9 in adult  E66.01 278.01    Z68.36 V85.36        Plan:     Problem List Items Addressed This Visit          Renal/    Dysuria     -acute   -order UA with reflex culture   -increase fluid hydration   -okay to utilize OTC AZO tablet  -encourage OTC probiotic and eat yogurt   -notify office for new or worsening symptoms           Relevant Orders    Urinalysis, Reflex to Urine Culture       Endocrine    Class 2 severe obesity due to excess calories with serious comorbidity and body mass index (BMI) of 36.0 to 36.9 in adult (Chronic)     Estimated body mass index is 36.52 kg/m² as calculated from the following:    Height as of this encounter: 5' (1.524 m).    Weight as of this encounter: 84.8 kg (187 lb).   -initiate Contrave, prescription sent to mail-in pharmacy  -encourage low-calorie low carb diet   -encourage some form of routine aerobic exercise           Relevant Medications    naltrexone-bupropion (CONTRAVE) 8-90  mg TbSR    Acquired hypothyroidism - Primary (Chronic)     Lab Results   Component Value Date    TSH 2.292 09/18/2024     -stable currently on levothyroxine 75 mcg daily, continue   -TSH for next visit            Other    SIRISHA (obstructive sleep apnea)     -encourage device compliance   -encourage device hygiene            Relevant Medications    naltrexone-bupropion (CONTRAVE) 8-90 mg TbSR     Other Visit Diagnoses       Severe obesity (BMI 35.0-39.9) with comorbidity        Relevant Medications    naltrexone-bupropion (CONTRAVE) 8-90 mg TbSR               Follow up for Previously scheduled and PRN if need.   -plan specifics discussed above    Orders Placed This Encounter    Urinalysis, Reflex to Urine Culture    naltrexone-bupropion (CONTRAVE) 8-90 mg TbSR        Medication List with Changes/Refills   Current Medications    ASPIRIN (ECOTRIN) 81 MG EC TABLET    Take 81 mg by mouth once daily.    CHOLECALCIFEROL, VITAMIN D3, 125 MCG (5,000 UNIT) TAB    Take 5,000 Units by mouth once daily.    CYANOCOBALAMIN (VITAMIN B-12) 1000 MCG TABLET    Take 100 mcg by mouth once daily.    DENOSUMAB (PROLIA) 60 MG/ML SYRG    Inject 60 mg into the skin every 6 (six) months.    FERROUS SULFATE 325 (65 FE) MG EC TABLET    Take 325 mg by mouth once daily.    LEVOTHYROXINE (SYNTHROID) 75 MCG TABLET    Take 1 tablet (75 mcg total) by mouth before breakfast.   Changed and/or Refilled Medications    Modified Medication Previous Medication    NALTREXONE-BUPROPION (CONTRAVE) 8-90 MG TBSR naltrexone-bupropion (CONTRAVE) 8-90 mg TbSR       Week 3 take 2 tabs AM and 1 Tab PM for 7 days Week 4 and there after take 2 tabs AM, and 2 tabs PM    Week 3 take 2 tabs AM and 1 Tab PM for 7 days Week 4 and there after take 2 tabs AM, and 2 tabs PM

## 2024-09-18 NOTE — ASSESSMENT & PLAN NOTE
Lab Results   Component Value Date    TSH 2.292 09/18/2024     -stable currently on levothyroxine 75 mcg daily, continue   -TSH for next visit

## 2024-09-18 NOTE — ASSESSMENT & PLAN NOTE
-acute   -order UA with reflex culture   -increase fluid hydration   -okay to utilize OTC AZO tablet  -encourage OTC probiotic and eat yogurt   -notify office for new or worsening symptoms

## 2024-09-18 NOTE — ASSESSMENT & PLAN NOTE
Estimated body mass index is 36.52 kg/m² as calculated from the following:    Height as of this encounter: 5' (1.524 m).    Weight as of this encounter: 84.8 kg (187 lb).   -initiate Contrave, prescription sent to mail-in pharmacy  -encourage low-calorie low carb diet   -encourage some form of routine aerobic exercise

## 2024-09-24 ENCOUNTER — TELEPHONE (OUTPATIENT)
Dept: INTERNAL MEDICINE | Facility: CLINIC | Age: 64
End: 2024-09-24
Payer: COMMERCIAL

## 2024-09-24 DIAGNOSIS — R10.9 FLANK PAIN: Primary | ICD-10-CM

## 2024-09-24 RX ORDER — MELOXICAM 15 MG/1
15 TABLET ORAL DAILY
Qty: 10 TABLET | Refills: 0 | Status: SHIPPED | OUTPATIENT
Start: 2024-09-24

## 2024-09-24 RX ORDER — TIZANIDINE 4 MG/1
4 TABLET ORAL 2 TIMES DAILY PRN
Qty: 10 TABLET | Refills: 0 | Status: SHIPPED | OUTPATIENT
Start: 2024-09-24 | End: 2024-10-04

## 2024-09-24 NOTE — TELEPHONE ENCOUNTER
----- Message from Zulma Oakley sent at 9/24/2024  8:34 AM CDT -----  Regarding: medical advice  Who Called: Shahnaz Chirinos    Caller is requesting assistance/information from provider's office.    Symptoms (please be specific):  possible uti(right side back pain)    How long has patient had these symptoms:  week     List of preferred pharmacies on file (remove unneeded):     Preferred Method of Contact: Phone Call    Patient's Preferred Phone Number on File: [phone]     Best Call Back Number, if different:    Additional Information: She stated same issues as last week.

## 2024-09-24 NOTE — TELEPHONE ENCOUNTER
Please add Abdomen and pelvis for flank pain to rule out kidney stone.  If having more midline back pain okay to add x-ray thoracic more lumbar pending on location of pain.  Okay to send in Mobic 15 mg daily p.r.n. pain times 10 days.  Tizanidine 4 mg b.i.d. p.r.n. muscle spasm x5 days.

## 2024-09-24 NOTE — TELEPHONE ENCOUNTER
Medications sent to pharmacy and CT ordered at [facility]. Patient was notified and will contact [facility] to set up scan at her convenience

## 2024-09-26 ENCOUNTER — TELEPHONE (OUTPATIENT)
Dept: INTERNAL MEDICINE | Facility: CLINIC | Age: 64
End: 2024-09-26
Payer: COMMERCIAL

## 2024-09-26 ENCOUNTER — TELEPHONE (OUTPATIENT)
Dept: INFUSION THERAPY | Facility: HOSPITAL | Age: 64
End: 2024-09-26
Payer: COMMERCIAL

## 2024-09-26 NOTE — TELEPHONE ENCOUNTER
Spoke with patient and she was given results and recommendations. Patient would like to hold off on PT for now, will call back if she wants referral.

## 2024-09-26 NOTE — TELEPHONE ENCOUNTER
Spoke with patient and she was given results and recommendations, verbalized understanding. She wanted to hold off on PT for right now.

## 2024-09-26 NOTE — TELEPHONE ENCOUNTER
----- Message from SHLOMO Valente sent at 9/26/2024  7:28 AM CDT -----  Please inform patient most recent CT abdomen/pelvis did not note any kidney stones or infection.  Continue to treat with previously prescribed anti-inflammatory and muscle relaxers.  Okay to refer to physical therapy if interested.  Notify clinic for   new or worsening symptoms

## 2024-09-26 NOTE — TELEPHONE ENCOUNTER
----- Message from Samira Frazier sent at 9/26/2024 11:05 AM CDT -----  .Type:  Patient Returning Call    Who Called:PT  Who Left Message for Patient:PT  Does the patient know what this is regarding?:call back  Would the patient rather a call back or a response via [facility]?   Best Call Back Number:[phone] please call after 2:30  Additional Information: Please have Harika call back

## 2024-09-30 ENCOUNTER — INFUSION (OUTPATIENT)
Dept: INFUSION THERAPY | Facility: HOSPITAL | Age: 64
End: 2024-09-30
Attending: NURSE PRACTITIONER
Payer: COMMERCIAL

## 2024-09-30 VITALS
OXYGEN SATURATION: 99 % | HEART RATE: 65 BPM | SYSTOLIC BLOOD PRESSURE: 135 MMHG | RESPIRATION RATE: 18 BRPM | DIASTOLIC BLOOD PRESSURE: 82 MMHG

## 2024-09-30 DIAGNOSIS — M85.80 OSTEOPENIA DUE TO CANCER THERAPY: Primary | ICD-10-CM

## 2024-09-30 PROCEDURE — 63600175 PHARM REV CODE 636 W HCPCS: Mod: JZ,JG | Performed by: NURSE PRACTITIONER

## 2024-09-30 PROCEDURE — 96372 THER/PROPH/DIAG INJ SC/IM: CPT

## 2024-09-30 RX ADMIN — DENOSUMAB 60 MG: 60 INJECTION SUBCUTANEOUS at 02:09

## 2024-12-09 ENCOUNTER — TELEPHONE (OUTPATIENT)
Dept: INTERNAL MEDICINE | Facility: CLINIC | Age: 64
End: 2024-12-09
Payer: COMMERCIAL

## 2024-12-11 ENCOUNTER — TELEPHONE (OUTPATIENT)
Dept: INTERNAL MEDICINE | Facility: CLINIC | Age: 64
End: 2024-12-11
Payer: COMMERCIAL

## 2024-12-11 NOTE — TELEPHONE ENCOUNTER
----- Message from Vanessa sent at 12/11/2024  8:28 AM CST -----  .Who Called: Shahnaz Chirinos    Caller is requesting assistance/information from provider's office.    Symptoms (please be specific): n/a   How long has patient had these symptoms:  n/a  List of preferred pharmacies on file (remove unneeded): [unfilled]  If different, enter pharmacy into here including location and phone number: n/a      Preferred Method of Contact: Phone Call    Patient's Preferred Phone Number on File: [phone]     Best Call Back Number, if different:    Additional Information: Wanted to inform [place] called back and r/s her 12/17 appointment.

## 2024-12-19 ENCOUNTER — TELEPHONE (OUTPATIENT)
Dept: INTERNAL MEDICINE | Facility: CLINIC | Age: 64
End: 2024-12-19
Payer: COMMERCIAL

## 2024-12-19 DIAGNOSIS — Z13.29 SCREENING FOR ENDOCRINE, NUTRITIONAL, METABOLIC AND IMMUNITY DISORDER: ICD-10-CM

## 2024-12-19 DIAGNOSIS — Z13.21 SCREENING FOR ENDOCRINE, NUTRITIONAL, METABOLIC AND IMMUNITY DISORDER: ICD-10-CM

## 2024-12-19 DIAGNOSIS — E55.9 VITAMIN D DEFICIENCY: ICD-10-CM

## 2024-12-19 DIAGNOSIS — Z13.89 SCREENING FOR CARDIOVASCULAR, RESPIRATORY, AND GENITOURINARY DISEASES: ICD-10-CM

## 2024-12-19 DIAGNOSIS — Z13.6 SCREENING FOR CARDIOVASCULAR, RESPIRATORY, AND GENITOURINARY DISEASES: ICD-10-CM

## 2024-12-19 DIAGNOSIS — Z13.0 SCREENING FOR ENDOCRINE, NUTRITIONAL, METABOLIC AND IMMUNITY DISORDER: ICD-10-CM

## 2024-12-19 DIAGNOSIS — E03.9 ACQUIRED HYPOTHYROIDISM: ICD-10-CM

## 2024-12-19 DIAGNOSIS — Z13.228 SCREENING FOR ENDOCRINE, NUTRITIONAL, METABOLIC AND IMMUNITY DISORDER: ICD-10-CM

## 2024-12-19 DIAGNOSIS — E03.8 TSH (THYROID-STIMULATING HORMONE DEFICIENCY): Primary | ICD-10-CM

## 2024-12-19 DIAGNOSIS — E03.8 TSH (THYROID-STIMULATING HORMONE DEFICIENCY): ICD-10-CM

## 2024-12-19 DIAGNOSIS — Z13.83 SCREENING FOR CARDIOVASCULAR, RESPIRATORY, AND GENITOURINARY DISEASES: ICD-10-CM

## 2024-12-19 DIAGNOSIS — Z00.00 WELLNESS EXAMINATION: ICD-10-CM

## 2024-12-19 RX ORDER — LEVOTHYROXINE SODIUM 75 UG/1
75 TABLET ORAL
Qty: 90 TABLET | Refills: 3 | Status: SHIPPED | OUTPATIENT
Start: 2024-12-19

## 2024-12-19 NOTE — PROGRESS NOTES
SUBJECTIVE:   Shahnaz Chirinos is a 64 y.o. female who complains of coryza, congestion, sneezing, sore throat, headache, and chills for 3 days. She denies a history of cough and denies a history of asthma. Patient does not smoke cigarettes.     OBJECTIVE:  She appears well, vital signs are as noted. Ears normal.  Throat and pharynx normal.  Neck supple. No adenopathy in the neck. Nose is congested. Sinuses non tender. The chest is clear, without wheezes or rales.    ASSESSMENT:   viral upper respiratory illness and sinusitis    PLAN:  Symptomatic therapy suggested: push fluids, rest, use acetaminophen, ibuprofen, antihistamine-decongestant of choice, cough suppressant of choice prn, and return office visit prn if symptoms persist or worsen.   Since patient having symptoms like sinusitis, I will go ahead and send prescription for Augmentin and Medrol Dosepak   . Call or return to clinic prn if these symptoms worsen or fail to improve as anticipated.   Answers submitted by the patient for this visit:  Review of Systems Questionnaire (Submitted on 12/20/2024)  activity change: No  unexpected weight change: No  neck pain: No  hearing loss: No  rhinorrhea: Yes  trouble swallowing: No  eye discharge: No  visual disturbance: No  chest tightness: No  wheezing: No  chest pain: No  palpitations: No  blood in stool: No  constipation: No  vomiting: No  diarrhea: No  polydipsia: No  polyuria: No  difficulty urinating: No  hematuria: No  menstrual problem: No  dysuria: No  joint swelling: No  arthralgias: No  headaches: Yes  weakness: No  confusion: No  dysphoric mood: No

## 2024-12-19 NOTE — TELEPHONE ENCOUNTER
----- Message from Samira sent at 12/19/2024  9:05 AM CST -----  .Type:  Needs Medical Advice    Who Called: pt  Symptoms (please be specific): Sinus Infection   How long has patient had these symptoms:  Tuesday  Pharmacy name and phone #:  [facility] [address]  Would the patient rather a call back or a response via [facility]? cb  Best Call Back Number: [phone]  Additional Information: Please call something in for a sinus infection      .Type:  RX Refill Request    Who Called: pt  Refill or New Rx:refill   RX Name and Strength:levothyroxine (SYNTHROID) 75 MCG tablet  How is the patient currently taking it? (ex. 1XDay):1/day  Is this a 30 day or 90 day RX:30  Preferred Pharmacy with phone number:[facility] [address]  Local or Mail Order:Local  Ordering Provider:Violette   Would the patient rather a call back or a response via [facility]?   Best Call Back Number:[phone]  Additional Information: levothyroxine (SYNTHROID) 75 MCG tablet

## 2024-12-20 ENCOUNTER — OFFICE VISIT (OUTPATIENT)
Dept: INTERNAL MEDICINE | Facility: CLINIC | Age: 64
End: 2024-12-20
Payer: COMMERCIAL

## 2024-12-20 VITALS — DIASTOLIC BLOOD PRESSURE: 82 MMHG | SYSTOLIC BLOOD PRESSURE: 134 MMHG

## 2024-12-20 DIAGNOSIS — J01.10 ACUTE NON-RECURRENT FRONTAL SINUSITIS: Primary | ICD-10-CM

## 2024-12-20 RX ORDER — AMOXICILLIN AND CLAVULANATE POTASSIUM 500; 125 MG/1; MG/1
1 TABLET, FILM COATED ORAL 3 TIMES DAILY
Qty: 21 TABLET | Refills: 0 | Status: SHIPPED | OUTPATIENT
Start: 2024-12-20

## 2024-12-20 RX ORDER — METHYLPREDNISOLONE 4 MG/1
TABLET ORAL
Qty: 21 EACH | Refills: 0 | Status: SHIPPED | OUTPATIENT
Start: 2024-12-20 | End: 2025-01-10

## 2025-02-12 ENCOUNTER — TELEPHONE (OUTPATIENT)
Dept: HEMATOLOGY/ONCOLOGY | Facility: CLINIC | Age: 65
End: 2025-02-12
Payer: COMMERCIAL

## 2025-02-12 ENCOUNTER — PATIENT MESSAGE (OUTPATIENT)
Dept: HEMATOLOGY/ONCOLOGY | Facility: CLINIC | Age: 65
End: 2025-02-12
Payer: COMMERCIAL

## 2025-02-12 DIAGNOSIS — R11.0 NAUSEA: Primary | ICD-10-CM

## 2025-02-12 RX ORDER — ONDANSETRON 4 MG/1
4 TABLET, ORALLY DISINTEGRATING ORAL EVERY 8 HOURS PRN
Qty: 20 TABLET | Refills: 0 | Status: SHIPPED | OUTPATIENT
Start: 2025-02-12 | End: 2025-02-13 | Stop reason: SDUPTHER

## 2025-02-12 NOTE — TELEPHONE ENCOUNTER
Patient requesting anti-emetic to take prior to MRI tomorrow. States she get nauseated and at times has vomited. She feels it may be r/t contrast. Please advise.

## 2025-02-13 ENCOUNTER — APPOINTMENT (OUTPATIENT)
Dept: RADIOLOGY | Facility: HOSPITAL | Age: 65
End: 2025-02-13
Attending: INTERNAL MEDICINE
Payer: COMMERCIAL

## 2025-02-13 DIAGNOSIS — Z91.89 AT HIGH RISK FOR BREAST CANCER: ICD-10-CM

## 2025-02-13 DIAGNOSIS — R11.0 NAUSEA: ICD-10-CM

## 2025-02-13 DIAGNOSIS — N60.92 ATYPICAL DUCTAL HYPERPLASIA OF LEFT BREAST: ICD-10-CM

## 2025-02-13 PROCEDURE — 25500020 PHARM REV CODE 255: Performed by: INTERNAL MEDICINE

## 2025-02-13 PROCEDURE — 77049 MRI BREAST C-+ W/CAD BI: CPT | Mod: TC

## 2025-02-13 PROCEDURE — 77049 MRI BREAST C-+ W/CAD BI: CPT | Mod: 26,,, | Performed by: RADIOLOGY

## 2025-02-13 PROCEDURE — A9577 INJ MULTIHANCE: HCPCS | Performed by: INTERNAL MEDICINE

## 2025-02-13 RX ORDER — ONDANSETRON 4 MG/1
4 TABLET, ORALLY DISINTEGRATING ORAL EVERY 8 HOURS PRN
Qty: 20 TABLET | Refills: 0 | Status: SHIPPED | OUTPATIENT
Start: 2025-02-13

## 2025-02-13 RX ADMIN — GADOBENATE DIMEGLUMINE 15 ML: 529 INJECTION, SOLUTION INTRAVENOUS at 02:02

## 2025-02-26 ENCOUNTER — RESULTS FOLLOW-UP (OUTPATIENT)
Dept: HEMATOLOGY/ONCOLOGY | Facility: CLINIC | Age: 65
End: 2025-02-26

## 2025-02-26 ENCOUNTER — OFFICE VISIT (OUTPATIENT)
Dept: HEMATOLOGY/ONCOLOGY | Facility: CLINIC | Age: 65
End: 2025-02-26
Payer: COMMERCIAL

## 2025-02-26 VITALS
TEMPERATURE: 98 F | OXYGEN SATURATION: 94 % | DIASTOLIC BLOOD PRESSURE: 80 MMHG | WEIGHT: 193.31 LBS | RESPIRATION RATE: 18 BRPM | HEIGHT: 60 IN | HEART RATE: 67 BPM | SYSTOLIC BLOOD PRESSURE: 134 MMHG | BODY MASS INDEX: 37.95 KG/M2

## 2025-02-26 DIAGNOSIS — M85.80 OSTEOPENIA DUE TO CANCER THERAPY: ICD-10-CM

## 2025-02-26 DIAGNOSIS — Z79.811 USE OF LETROZOLE (FEMARA): ICD-10-CM

## 2025-02-26 DIAGNOSIS — N60.92 ATYPICAL DUCTAL HYPERPLASIA OF LEFT BREAST: Primary | ICD-10-CM

## 2025-02-26 DIAGNOSIS — Z91.89 AT HIGH RISK FOR BREAST CANCER: ICD-10-CM

## 2025-02-26 LAB
ALBUMIN SERPL-MCNC: 3.9 G/DL (ref 3.4–4.8)
ALBUMIN/GLOB SERPL: 1.3 RATIO (ref 1.1–2)
ALP SERPL-CCNC: 76 UNIT/L (ref 40–150)
ALT SERPL-CCNC: 10 UNIT/L (ref 0–55)
ANION GAP SERPL CALC-SCNC: 5 MEQ/L
AST SERPL-CCNC: 19 UNIT/L (ref 5–34)
BASOPHILS # BLD AUTO: 0.04 X10(3)/MCL
BASOPHILS NFR BLD AUTO: 0.5 %
BILIRUB SERPL-MCNC: 1 MG/DL
BUN SERPL-MCNC: 11.2 MG/DL (ref 9.8–20.1)
CALCIUM SERPL-MCNC: 9.1 MG/DL (ref 8.4–10.2)
CHLORIDE SERPL-SCNC: 108 MMOL/L (ref 98–107)
CO2 SERPL-SCNC: 28 MMOL/L (ref 23–31)
CREAT SERPL-MCNC: 0.83 MG/DL (ref 0.55–1.02)
CREAT/UREA NIT SERPL: 13
EOSINOPHIL # BLD AUTO: 0.14 X10(3)/MCL (ref 0–0.9)
EOSINOPHIL NFR BLD AUTO: 1.6 %
ERYTHROCYTE [DISTWIDTH] IN BLOOD BY AUTOMATED COUNT: 13.2 % (ref 11.5–17)
GFR SERPLBLD CREATININE-BSD FMLA CKD-EPI: >60 ML/MIN/1.73/M2
GLOBULIN SER-MCNC: 2.9 GM/DL (ref 2.4–3.5)
GLUCOSE SERPL-MCNC: 89 MG/DL (ref 82–115)
HCT VFR BLD AUTO: 39.2 % (ref 37–47)
HGB BLD-MCNC: 13.9 G/DL (ref 12–16)
IMM GRANULOCYTES # BLD AUTO: 0.02 X10(3)/MCL (ref 0–0.04)
IMM GRANULOCYTES NFR BLD AUTO: 0.2 %
LYMPHOCYTES # BLD AUTO: 2.28 X10(3)/MCL (ref 0.6–4.6)
LYMPHOCYTES NFR BLD AUTO: 26.5 %
MCH RBC QN AUTO: 29.7 PG (ref 27–31)
MCHC RBC AUTO-ENTMCNC: 35.5 G/DL (ref 33–36)
MCV RBC AUTO: 83.8 FL (ref 80–94)
MONOCYTES # BLD AUTO: 0.58 X10(3)/MCL (ref 0.1–1.3)
MONOCYTES NFR BLD AUTO: 6.7 %
NEUTROPHILS # BLD AUTO: 5.55 X10(3)/MCL (ref 2.1–9.2)
NEUTROPHILS NFR BLD AUTO: 64.5 %
PLATELET # BLD AUTO: 287 X10(3)/MCL (ref 130–400)
PMV BLD AUTO: 10 FL (ref 7.4–10.4)
POTASSIUM SERPL-SCNC: 4 MMOL/L (ref 3.5–5.1)
PROT SERPL-MCNC: 6.8 GM/DL (ref 5.8–7.6)
RBC # BLD AUTO: 4.68 X10(6)/MCL (ref 4.2–5.4)
SODIUM SERPL-SCNC: 141 MMOL/L (ref 136–145)
WBC # BLD AUTO: 8.61 X10(3)/MCL (ref 4.5–11.5)

## 2025-02-26 PROCEDURE — 1160F RVW MEDS BY RX/DR IN RCRD: CPT | Mod: CPTII,S$GLB,, | Performed by: NURSE PRACTITIONER

## 2025-02-26 PROCEDURE — 99215 OFFICE O/P EST HI 40 MIN: CPT | Mod: S$GLB,,, | Performed by: NURSE PRACTITIONER

## 2025-02-26 PROCEDURE — 99999 PR PBB SHADOW E&M-EST. PATIENT-LVL V: CPT | Mod: PBBFAC,,, | Performed by: NURSE PRACTITIONER

## 2025-02-26 PROCEDURE — 3075F SYST BP GE 130 - 139MM HG: CPT | Mod: CPTII,S$GLB,, | Performed by: NURSE PRACTITIONER

## 2025-02-26 PROCEDURE — 3008F BODY MASS INDEX DOCD: CPT | Mod: CPTII,S$GLB,, | Performed by: NURSE PRACTITIONER

## 2025-02-26 PROCEDURE — 85025 COMPLETE CBC W/AUTO DIFF WBC: CPT | Performed by: NURSE PRACTITIONER

## 2025-02-26 PROCEDURE — 3079F DIAST BP 80-89 MM HG: CPT | Mod: CPTII,S$GLB,, | Performed by: NURSE PRACTITIONER

## 2025-02-26 PROCEDURE — 1159F MED LIST DOCD IN RCRD: CPT | Mod: CPTII,S$GLB,, | Performed by: NURSE PRACTITIONER

## 2025-02-26 PROCEDURE — 80053 COMPREHEN METABOLIC PANEL: CPT | Performed by: NURSE PRACTITIONER

## 2025-02-26 PROCEDURE — 36415 COLL VENOUS BLD VENIPUNCTURE: CPT | Performed by: NURSE PRACTITIONER

## 2025-02-26 NOTE — PROGRESS NOTES
HEMATOLOGY/ONCOLOGY OFFICE CLINIC VISIT    Initial Consultation: 7/9/2019  Referring Physician: Dr. Cook  Other Physicians:  Code Status:    Diagnosis/Problem list:   Atypical ductal hyperplasia of the left breast--Dx 4/2019    Present Treatment:  Close surveillance for high-risk breast cancer patient with mammogram Q year alternating with MRI of the breast Q year    Treatment history:    Left breast excisional biopsy on 6/14/2019  Femara 2.5 mg daily x 5 yrs completed 3/2024      Imaging:  Screening mammogram 4/18/2019 was read as BI-RADS Category 0  Diagnostic Sammy left mammogram and ultrasound 5/13/2019, left breast 9 o'clock position 3 cm from the nipple mass and suspicious.  BI-RADS Category 4. Two additional masses in the left breast seen sonographically favored to represent complicated cysts.  BI-RADS Category 3: Probably benign.  Left breast 12 o'clock position 3 cm from the nipple complicated cyst, benign, BI-RADS Category 2.  Bone density 7/11/2019-normal  MRI breast 11/27/19: Post surgical change of the 9:00 left breast related to recent excisional biopsy.  Multiple benign cysts throughout both breasts. MRI BI-RADS: 2 Benign  BILATERAL DIGITAL DIAGNOSTIC MAMMOGRAM 3D/2D WITH CAD AND TARGETED LEFT ULTRASOUND: 2/11/2020  IMPRESSION: BENIGN, ULTRASOUND PROBABLY BENIGN   1) Left breast 9:00 axis periareolar position fat necrosis at the site of prior surgical excisional biopsy correlates with the patient's area of palpable concern and is benign. BI-RADS 2: benign.  2) Left breast 11:00 axis 3 cm FN mass is stable over 9 months of sonographic follow-up and is still considered probably benign, most likely a complicated cyst. BI-RADS 3: probably benign.   3) Left breast 10:00 axis 4 cm FN simple cyst is benign. BI-RADS 2: benign.  4) No mammographic evidence of malignancy in either breast.  RECOMMENDATIONS:  Recommend continued follow-up of the left breast 11:00 axis 3 cm FN mass per the standard BI-RADS 3  protocol.    US left breast 5/14/2020: No sonographic evidence of malignancy is identified in the 11:00 left breast. Simple cyst in the 11:00 left breast is benign. Ultrasound BI-RADS: 2 Benign   MRI of the breast 11/17/2020 with no suspicious enhancement in either breast to suggest malignancy.  Routine screening mammography in February 2021 is recommended with additional consideration of continue on a supplement of breast cancer screening with dynamic contrast-enhanced breast MRI ideally 6 months following mammogram in this high risk patient with a lifetime risk of 35.1%.  MRI of the breast 12/12/2022:   MMG 2/23/2023: -RADS: 2 Benign. Continued annual mammography and supplemental breast screening with dynamic contrast enhanced breast MRI is suggested for this high-risk patient, preferably alternating mammography and MRI every 6 months.  - Routine annual screening mammography is due in 2 months unless clinical signs or symptoms warrant earlier evaluation (February 2023).  D MMG/US RIGHT 3/28/2024: The possible architectural distortion seen in the right breast medial region middle depth in the CC projection on recent screening mammogram effaced into normal-appearing fibroglandular tissue on today's spot compression tomosynthesis images.  Targeted ultrasound evaluation No suspicious sonographic abnormality is evident in the medial right breast, including within the area of concern on recent screening mammogram.  There are several benign cysts of varying size and complication in the medial right breast, including a 1.9 cm x 0.6 cm x 1.7 cm simple cyst in the 12:00 right breast 4 cm from the nipple, a 0.6 cm x 0.4 cm x 0.4 cm complicated cyst in the 3:00 periareolar right breast, and a 0.6 cm x 0.2 cm x 0.4 cm complicated cyst in the 4:00 periareolar right breast.  IMPRESSION: BENIGN  1.  No mammographic or sonographic evidence of malignancy in the imaged right breast.    2.  Several benign cysts of varying size and  complication in the medial right breast, the largest in the 12:00 right breast 4 cm from the nipple measuring 1.9 cm.  RECOMMENDATIONS:   Return to annual screening mammography is recommended unless clinical signs or symptoms warrant earlier evaluation (March 2025).     Continued annual supplemental breast cancer screening with dynamic contrast enhanced breast MRI is also recommended for this high-risk patient with a lifetime risk of breast cancer of 34.6% based on the Tyrer-Cuzick risk assessment model, preferably alternating mammography and MRI every 6 months (September 2024).     Pathology:    CLINICAL HISTORY:       Patient: 61 y/o female kindly referred by Dr. Cook for atypical ductal hyperplasia. Screening mammogram performed on 4/18/2019 was read as BI-RADS Category 0 further imaging left diagnostic mammography with tomosynthesis was recommended to evaluate annual asymmetry in the left breast posterior depth central to the nipple on the cranial caudal view.  On 5/13/2019, diagnostic Sammy left mammogram and ultrasound show a left breast 9 o'clock position 3 cm from the nipple mass and suspicious.  BI-RADS Category 4.  2 additional masses in the left breast seen sonographically favored to represent complicated cysts.  BI-RADS Category 3: Probably benign.  Left breast 12 o'clock position 3 cm from the nipple complicated cyst, benign, BI-RADS Category 2.  Ultrasound-guided biopsy of the left breast at the 9 o'clock position was performed on 5/24/2019.  Pathology report revealed a typical papilloma, papilloma with atypical ductal hyperplasia).  An intracystic papillary carcinoma/ductal carcinoma in situ cannot be rule out.  Surgical consultation was recommended.    Left breast excisional biopsy on 6/14/2019 showed focal residual a typical papilloma.  No evidence of invasive carcinoma.  Patient was then referred for chemoprophylaxis.    Patient does not have history of breast or ovarian cancer.  There is lung and  throat cancer in her family.  She took birth control pills of unknown when younger.  Partial hysterectomy and unilateral oophorectomy in her mid 30s.      Chief Complaint:       Interval History:  Virtual visit in follow-up of atypical ductal hyperplasia. Recent screening mammogram on 3/6/24 showed abnormality so she is scheduled for diagnostic right mammogram and US on 3/28/24. She will complete 5 years of endocrine therapy ( for chemoprophylaxis )   MMG was read Benign (see above). Will continue routine followup for high risk breast cancer patient    Interval history  2/26/2025:  April 11, 2024 patient was seen and evaluated by Internal Medicine service's for upper respiratory tract infection.    April 12, 2024 patient was contacted by primary care to let her know that she had that is the positive for flu  June 18, 2024 patient was seen and evaluated by Internal Medicine service's for multiple comorbid conditions including hypothyroidism, vitamin-D deficiency, GERD and sleep apnea.  August 15, 2024 patient was seen and evaluated by gyn.    September 18, 2024 patient was seen and evaluated by family Medicine for hypothyroidism.  September 25, 2024 patient underwent CT scan of the abdomen and pelvis without contrast for complaints of unspecified abdominal pain  September 26, 2024 the patient was contacted by Internal Medicine services with results of CT scan abdomen/pelvis which did not note any kidney stones or infection.  She was recommended to continue to treat with previously prescribed anti-inflammatory and muscle relaxers.  Plans to refer to physical therapy if interested and instructed to Notify clinic for new or worsening symptoms.  September 30, 2024 patient is status post Prolia injection.    December 20, 2024 patient was seen and evaluated by me Internal Medicine services for frontal sinusitis.    February 17, 2025 MRI breast with and without contrast bilateral shows no evidence of malignancy on either  breast. Comparison is made to exams dated: 3/28/2024 mammogram, 9/21/2023 breast MRI, 12/12/2022 breast MRI, 11/9/2021 breast MRI, 11/17/2020 breast MRI, and 2/2/2012 mammogram - [facility].   On today's visit the patient presents to the office for follow-up and management of her atypical ductal hyperplasia.  She is no longer on aromatase inhibitor she completed 5 years of therapy 2024.  She continues to have mammograms alternating with MRI.  She is here to discuss the results of the most recent MRI that was performed on February 17, 2025.      ROS:All 14 points ROS taken and positive as per Interval History, all other negative.  Review of Systems   Constitutional:  Negative for chills, fever, malaise/fatigue and weight loss.   HENT:  Negative for congestion and nosebleeds.    Eyes:  Negative for blurred vision, double vision and photophobia.   Respiratory:  Negative for cough, hemoptysis and shortness of breath.    Cardiovascular:  Negative for chest pain, palpitations, leg swelling and PND.   Gastrointestinal:  Negative for abdominal pain, blood in stool, constipation, diarrhea, melena, nausea and vomiting.   Genitourinary:  Negative for dysuria, frequency, hematuria and urgency.   Musculoskeletal:  Negative for back pain, falls and myalgias.   Skin:  Negative for itching and rash.   Neurological:  Negative for tremors, focal weakness, seizures, weakness and headaches.   Endo/Heme/Allergies:  Negative for environmental allergies. Does not bruise/bleed easily.   Psychiatric/Behavioral:  Negative for depression and suicidal ideas. The patient is not nervous/anxious.    Answers submitted by the patient for this visit:  Review of Systems Questionnaire (Submitted on 4/4/2024)  appetite change : No  unexpected weight change: No  mouth sores: No  visual disturbance: No  adenopathy: No      Histories:  PMH/PSH/FH/SOCIAL/ALLERGIES AND MEDS REVIEWED AND UPDATED AS APPROPRIATE     Vitals:     02/26/25 0848   BP: 134/80   BP Location: Left arm   Patient Position: Sitting   Pulse: 67   Resp: 18   Temp: 97.8 °F (36.6 °C)   TempSrc: Oral   SpO2: (!) 94%   Weight: 87.7 kg (193 lb 4.8 oz)   Height: 5' (1.524 m)        Physical Exam  Vitals reviewed: LIMITED DUE TO TELEMEDICINE RESTRICTIONS..   Constitutional:       Appearance: Normal appearance.   HENT:      Head: Normocephalic.   Eyes:      Extraocular Movements: Extraocular movements intact.   Pulmonary:      Effort: Pulmonary effort is normal.   Musculoskeletal:      Cervical back: Neck supple.   Neurological:      Mental Status: She is alert.   Psychiatric:         Mood and Affect: Mood normal.         Behavior: Behavior normal.         Thought Content: Thought content normal.         Judgment: Judgment normal.       ECOG SCORE             Laboratory:  CBC with Differential:  Lab Results   Component Value Date    WBC 8.61 02/26/2025    RBC 4.68 02/26/2025    HGB 13.9 02/26/2025    HCT 39.2 02/26/2025    MCV 83.8 02/26/2025    MCH 29.7 02/26/2025    MCHC 35.5 02/26/2025    RDW 13.2 02/26/2025     02/26/2025    MPV 10.0 02/26/2025        CMP:  Sodium   Date Value Ref Range Status   03/12/2024 139 136 - 145 mmol/L Final     Potassium   Date Value Ref Range Status   03/12/2024 3.9 3.5 - 5.1 mmol/L Final     Chloride   Date Value Ref Range Status   03/12/2024 105 98 - 107 mmol/L Final     CO2   Date Value Ref Range Status   03/12/2024 26 23 - 31 mmol/L Final     Blood Urea Nitrogen   Date Value Ref Range Status   03/12/2024 13.4 9.8 - 20.1 mg/dL Final     Creatinine   Date Value Ref Range Status   03/12/2024 0.85 0.55 - 1.02 mg/dL Final     Calcium   Date Value Ref Range Status   03/12/2024 9.5 8.4 - 10.2 mg/dL Final     Albumin   Date Value Ref Range Status   03/12/2024 4.0 3.4 - 4.8 g/dL Final     Bilirubin Total   Date Value Ref Range Status   03/12/2024 1.2 <=1.5 mg/dL Final     ALP   Date Value Ref Range Status   03/12/2024 70 40 - 150 unit/L Final      AST   Date Value Ref Range Status   03/12/2024 27 5 - 34 unit/L Final     ALT   Date Value Ref Range Status   03/12/2024 15 0 - 55 unit/L Final     Estimated GFR-Non    Date Value Ref Range Status   02/10/2022 >60           Assessment:       1. Atypical ductal hyperplasia of left breast    2. At high risk for breast cancer    3. Use of letrozole (Femara)    4. 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 Women with atypical hyperplasia have a lifetime risk of breast cancer that is about four times higher than that of women who don't have atypical hyperplasia: At 5 years after diagnosis, about 7 percent of women with atypical hyperplasia may develop breast cancer.  At 10 years after diagnosis, about 13 percent of women with atypical hyperplasia may develop breast cancer. At 25 years after diagnosis, about 30 percent of women with atypical hyperplasia may develop breast cancer.-- Treatment with medications such as tamoxifen or raloxifene (Evista), or aromatase inhibitors, such as exemestane (Aromasin) and anastrozole (Arimidex) for five years may reduce the risk of breast cancer.  Discussed risks vs benefits as well as toxicities associated with these medications. She was started on Aromasin--too expensive, now on Femara  Patient with an elevated lifetime risk of developing breast cancer (35.1% according to the Tyrer-Cuzick model). Will cont mammograms alternating with Breast MRI's  2) Hot flashes due to AI's  3) Appointment with Dr. Caputo for genetic testing--did not meet criteria for testing  4) Bone density 7/11/2019 was normal it will be repeated q 2 yrs.  If osteopenia or osteoporosis then she will be started on Prolia      Plan:       Dx:  Atypical ductal hyperplasia left breast  Status:  Under active surveillance  Completed 5 yrs AI 3/2024.   Reviewed with the patient the  results of the most recent MRI that shows no evidence of disease  We will continue Close surveillance for high-risk breast cancer patient with mammogram Q year alternating with MRI of the breast Q year  Patient will be due for mammogram in August/2025-a will be ordered today  I will see the patient in follow-up mammogram has been done.  Dx:  Hot Flashes  Status:  Resolved  Hot flashes have resolved since the patient has stopped aromatase inhibitor therapy  Order actions are required.  Dx:  Bone health  Status:  Ongoing  Last bone density examination August 29, 2023 showing osteopenia.  Due bone density August 29, 2025  Continue calcium and vitamin-D  Continue Prolia injections, due for the next injection March 31, 2025 she already had the appointment as scheduled.  Denies jaw pain recent or schedule invasive dental work.  We will proceed with CBC and CMP today.            Patient instructions and visit orders.       -Follow-up:  F/U with - 8/26/2025   -Labs:  CBC, CMP- 8/26/2025   -Imaging:  Mammogram, bilateral screening-scheduled for the week of August 18, 2025  Bone density-schedule for August/2025 prior to appointment  -Other orders:  Treatment, Prolia-March 31, 2025 (already scheduled)  Treatment, Prolia-October 1, 2025.    46 were spent on this encounter    Jose Olmos, MSN, FNP-BC, APRN, AOCNP   Department of Hematology/Oncology.

## 2025-03-18 ENCOUNTER — TELEPHONE (OUTPATIENT)
Dept: INTERNAL MEDICINE | Facility: CLINIC | Age: 65
End: 2025-03-18
Payer: COMMERCIAL

## 2025-03-18 NOTE — TELEPHONE ENCOUNTER
----- Message from Med Assistant Kilpatrick sent at 3/13/2025  9:35 AM CDT -----  Regarding:  Tuesday 3-25-25  Wellness AppointmentFasting wellness labs ordered and ready to do. Last Wellness 12-14-23

## 2025-03-25 ENCOUNTER — OFFICE VISIT (OUTPATIENT)
Dept: INTERNAL MEDICINE | Facility: CLINIC | Age: 65
End: 2025-03-25
Payer: COMMERCIAL

## 2025-03-25 ENCOUNTER — LAB VISIT (OUTPATIENT)
Dept: LAB | Facility: HOSPITAL | Age: 65
End: 2025-03-25
Attending: INTERNAL MEDICINE
Payer: COMMERCIAL

## 2025-03-25 VITALS
HEIGHT: 60 IN | HEART RATE: 76 BPM | DIASTOLIC BLOOD PRESSURE: 78 MMHG | BODY MASS INDEX: 37.69 KG/M2 | SYSTOLIC BLOOD PRESSURE: 122 MMHG | OXYGEN SATURATION: 98 % | WEIGHT: 192 LBS

## 2025-03-25 DIAGNOSIS — E55.9 VITAMIN D DEFICIENCY: ICD-10-CM

## 2025-03-25 DIAGNOSIS — Z13.89 SCREENING FOR CARDIOVASCULAR, RESPIRATORY, AND GENITOURINARY DISEASES: ICD-10-CM

## 2025-03-25 DIAGNOSIS — Z12.39 ENCOUNTER FOR SCREENING FOR MALIGNANT NEOPLASM OF BREAST, UNSPECIFIED SCREENING MODALITY: ICD-10-CM

## 2025-03-25 DIAGNOSIS — E03.9 ACQUIRED HYPOTHYROIDISM: Chronic | ICD-10-CM

## 2025-03-25 DIAGNOSIS — M85.80 OSTEOPENIA DUE TO CANCER THERAPY: Chronic | ICD-10-CM

## 2025-03-25 DIAGNOSIS — E66.01 CLASS 2 SEVERE OBESITY DUE TO EXCESS CALORIES WITH SERIOUS COMORBIDITY AND BODY MASS INDEX (BMI) OF 37.0 TO 37.9 IN ADULT: Chronic | ICD-10-CM

## 2025-03-25 DIAGNOSIS — E66.812 CLASS 2 SEVERE OBESITY DUE TO EXCESS CALORIES WITH SERIOUS COMORBIDITY AND BODY MASS INDEX (BMI) OF 37.0 TO 37.9 IN ADULT: Chronic | ICD-10-CM

## 2025-03-25 DIAGNOSIS — E55.9 VITAMIN D DEFICIENCY: Chronic | ICD-10-CM

## 2025-03-25 DIAGNOSIS — Z13.29 SCREENING FOR ENDOCRINE, NUTRITIONAL, METABOLIC AND IMMUNITY DISORDER: ICD-10-CM

## 2025-03-25 DIAGNOSIS — Z23 NEED FOR PNEUMOCOCCAL VACCINATION: ICD-10-CM

## 2025-03-25 DIAGNOSIS — E61.1 IRON DEFICIENCY: Chronic | ICD-10-CM

## 2025-03-25 DIAGNOSIS — Z00.00 WELLNESS EXAMINATION: ICD-10-CM

## 2025-03-25 DIAGNOSIS — G47.33 OSA (OBSTRUCTIVE SLEEP APNEA): Chronic | ICD-10-CM

## 2025-03-25 DIAGNOSIS — Z13.0 SCREENING FOR ENDOCRINE, NUTRITIONAL, METABOLIC AND IMMUNITY DISORDER: ICD-10-CM

## 2025-03-25 DIAGNOSIS — Z13.21 SCREENING FOR ENDOCRINE, NUTRITIONAL, METABOLIC AND IMMUNITY DISORDER: ICD-10-CM

## 2025-03-25 DIAGNOSIS — Z13.83 SCREENING FOR CARDIOVASCULAR, RESPIRATORY, AND GENITOURINARY DISEASES: ICD-10-CM

## 2025-03-25 DIAGNOSIS — Z00.00 WELLNESS EXAMINATION: Primary | ICD-10-CM

## 2025-03-25 DIAGNOSIS — Z13.228 SCREENING FOR ENDOCRINE, NUTRITIONAL, METABOLIC AND IMMUNITY DISORDER: ICD-10-CM

## 2025-03-25 DIAGNOSIS — Z13.6 SCREENING FOR CARDIOVASCULAR, RESPIRATORY, AND GENITOURINARY DISEASES: ICD-10-CM

## 2025-03-25 DIAGNOSIS — E03.9 ACQUIRED HYPOTHYROIDISM: ICD-10-CM

## 2025-03-25 PROBLEM — R30.0 DYSURIA: Status: RESOLVED | Noted: 2024-09-18 | Resolved: 2025-03-25

## 2025-03-25 LAB
25(OH)D3+25(OH)D2 SERPL-MCNC: 25 NG/ML (ref 30–80)
ALBUMIN SERPL-MCNC: 3.8 G/DL (ref 3.4–4.8)
ALBUMIN/GLOB SERPL: 1.2 RATIO (ref 1.1–2)
ALP SERPL-CCNC: 68 UNIT/L (ref 40–150)
ALT SERPL-CCNC: 14 UNIT/L (ref 0–55)
ANION GAP SERPL CALC-SCNC: 6 MEQ/L
AST SERPL-CCNC: 20 UNIT/L (ref 11–45)
BASOPHILS # BLD AUTO: 0.08 X10(3)/MCL
BASOPHILS NFR BLD AUTO: 0.9 %
BILIRUB SERPL-MCNC: 1.3 MG/DL
BUN SERPL-MCNC: 15 MG/DL (ref 9.8–20.1)
CALCIUM SERPL-MCNC: 8.7 MG/DL (ref 8.4–10.2)
CHLORIDE SERPL-SCNC: 106 MMOL/L (ref 98–107)
CHOLEST SERPL-MCNC: 191 MG/DL
CHOLEST/HDLC SERPL: 4 {RATIO} (ref 0–5)
CO2 SERPL-SCNC: 27 MMOL/L (ref 23–31)
CREAT SERPL-MCNC: 0.78 MG/DL (ref 0.55–1.02)
CREAT/UREA NIT SERPL: 19
EOSINOPHIL # BLD AUTO: 0.27 X10(3)/MCL (ref 0–0.9)
EOSINOPHIL NFR BLD AUTO: 3.1 %
ERYTHROCYTE [DISTWIDTH] IN BLOOD BY AUTOMATED COUNT: 13.5 % (ref 11.5–17)
EST. AVERAGE GLUCOSE BLD GHB EST-MCNC: 105.4 MG/DL
GFR SERPLBLD CREATININE-BSD FMLA CKD-EPI: >60 ML/MIN/1.73/M2
GLOBULIN SER-MCNC: 3.1 GM/DL (ref 2.4–3.5)
GLUCOSE SERPL-MCNC: 106 MG/DL (ref 82–115)
HBA1C MFR BLD: 5.3 %
HCT VFR BLD AUTO: 37.7 % (ref 37–47)
HDLC SERPL-MCNC: 52 MG/DL (ref 35–60)
HGB BLD-MCNC: 13.5 G/DL (ref 12–16)
IMM GRANULOCYTES # BLD AUTO: 0.03 X10(3)/MCL (ref 0–0.04)
IMM GRANULOCYTES NFR BLD AUTO: 0.3 %
LDLC SERPL CALC-MCNC: 101 MG/DL (ref 50–140)
LYMPHOCYTES # BLD AUTO: 2.72 X10(3)/MCL (ref 0.6–4.6)
LYMPHOCYTES NFR BLD AUTO: 30.7 %
MCH RBC QN AUTO: 29.3 PG (ref 27–31)
MCHC RBC AUTO-ENTMCNC: 35.8 G/DL (ref 33–36)
MCV RBC AUTO: 82 FL (ref 80–94)
MONOCYTES # BLD AUTO: 0.56 X10(3)/MCL (ref 0.1–1.3)
MONOCYTES NFR BLD AUTO: 6.3 %
NEUTROPHILS # BLD AUTO: 5.19 X10(3)/MCL (ref 2.1–9.2)
NEUTROPHILS NFR BLD AUTO: 58.7 %
NRBC BLD AUTO-RTO: 0 %
PLATELET # BLD AUTO: 299 X10(3)/MCL (ref 130–400)
PMV BLD AUTO: 10.2 FL (ref 7.4–10.4)
POTASSIUM SERPL-SCNC: 4 MMOL/L (ref 3.5–5.1)
PROT SERPL-MCNC: 6.9 GM/DL (ref 5.8–7.6)
RBC # BLD AUTO: 4.6 X10(6)/MCL (ref 4.2–5.4)
SODIUM SERPL-SCNC: 139 MMOL/L (ref 136–145)
T3 SERPL-MCNC: 74.44 NG/DL (ref 60–180)
T4 FREE SERPL-MCNC: 0.95 NG/DL (ref 0.7–1.48)
TRIGL SERPL-MCNC: 190 MG/DL (ref 37–140)
TSH SERPL-ACNC: 1.76 UIU/ML (ref 0.35–4.94)
VLDLC SERPL CALC-MCNC: 38 MG/DL
WBC # BLD AUTO: 8.85 X10(3)/MCL (ref 4.5–11.5)

## 2025-03-25 PROCEDURE — 80061 LIPID PANEL: CPT

## 2025-03-25 PROCEDURE — 3074F SYST BP LT 130 MM HG: CPT | Mod: CPTII,,,

## 2025-03-25 PROCEDURE — 90471 IMMUNIZATION ADMIN: CPT | Mod: ,,,

## 2025-03-25 PROCEDURE — 3078F DIAST BP <80 MM HG: CPT | Mod: CPTII,,,

## 2025-03-25 PROCEDURE — 80053 COMPREHEN METABOLIC PANEL: CPT

## 2025-03-25 PROCEDURE — 82306 VITAMIN D 25 HYDROXY: CPT

## 2025-03-25 PROCEDURE — 85025 COMPLETE CBC W/AUTO DIFF WBC: CPT

## 2025-03-25 PROCEDURE — 3008F BODY MASS INDEX DOCD: CPT | Mod: CPTII,,,

## 2025-03-25 PROCEDURE — 3044F HG A1C LEVEL LT 7.0%: CPT | Mod: CPTII,,,

## 2025-03-25 PROCEDURE — 83036 HEMOGLOBIN GLYCOSYLATED A1C: CPT

## 2025-03-25 PROCEDURE — 84480 ASSAY TRIIODOTHYRONINE (T3): CPT

## 2025-03-25 PROCEDURE — 99397 PER PM REEVAL EST PAT 65+ YR: CPT | Mod: 25,,,

## 2025-03-25 PROCEDURE — 90677 PCV20 VACCINE IM: CPT | Mod: ,,,

## 2025-03-25 PROCEDURE — 3288F FALL RISK ASSESSMENT DOCD: CPT | Mod: CPTII,,,

## 2025-03-25 PROCEDURE — 36415 COLL VENOUS BLD VENIPUNCTURE: CPT

## 2025-03-25 PROCEDURE — 84439 ASSAY OF FREE THYROXINE: CPT

## 2025-03-25 PROCEDURE — 1101F PT FALLS ASSESS-DOCD LE1/YR: CPT | Mod: CPTII,,,

## 2025-03-25 PROCEDURE — 1159F MED LIST DOCD IN RCRD: CPT | Mod: CPTII,,,

## 2025-03-25 PROCEDURE — 84443 ASSAY THYROID STIM HORMONE: CPT

## 2025-03-25 NOTE — ASSESSMENT & PLAN NOTE
-utilizes CPAP, continue   -encourage device hygiene  -encourage device compliance, Life time use expected  -Avoid excessive alcohol, smoking, and overuse of sedatives at bedtime  -Goal BMI <30.

## 2025-03-25 NOTE — ASSESSMENT & PLAN NOTE
Lab Results   Component Value Date    [id number] 25 (L) 03/25/2025      -acute on chronic  -currently on currently on OTC vitamin-D however unsure and takes every other day, encouraged to utilize at least 5000 units daily

## 2025-03-25 NOTE — ASSESSMENT & PLAN NOTE
Estimated body mass index is 37.5 kg/m² as calculated from the following:    Height as of this encounter: 5' (1.524 m).    Weight as of this encounter: 87.1 kg (192 lb).   -encourage low-calorie low carb diet   -encourage some form of routine aerobic exercise

## 2025-03-25 NOTE — PROGRESS NOTES
Patient ID: Shahnaz Chirinos is a 65 y.o. female.    Chief Complaint: Annual Exam (Annual Wellness exam- No complaints or concerns today.)    65-year-old female here today for a hypothyroidism follow-up visit. Medical comorbidities include hypothyroidism, vitamin D deficiency, GERD, SIRISHA, DDD, and atypical ductal hyperplasia of her left breast.       History of Present Illness    Patient presents today for routine wellness visit since last visit patient reports he has been feeling well without any recent injuries or illnesses.  Does still have some concerns regarding weight.  BMI 37.5 (192 lb).  Previously attempted to treat with Contrave , however stopped due to intolerable headaches.  Wellness labs reviewed and generally stable. Vitamin D level was 25 ng/mL, does take OTC vitamin-D unaware of strength every other day.       Wellness:  2025    MEDICAL HISTORY:    Past Medical History:   Diagnosis Date    Abdominal pain     Achilles tendonitis     Acquired hypothyroidism     Acute sinusitis     Cholelithiasis     DCIS (ductal carcinoma in situ)     Gastric reflux     Malaise and fatigue     Osteopenia     Vitamin D deficiency       Past Surgical History:   Procedure Laterality Date    BREAST SURGERY  2019     SECTION      CHOLECYSTECTOMY      EXC. OF BREAST LESION ID BY RADIOLOGICAL Left     KNEE SURGERY      LAPAROSCOPIC CHOLECYSTECTOMY      TOTAL ABDOMINAL HYSTERECTOMY      Per [facility]    TUBAL LIGATION        Social History[1]       Health Maintenance Due   Topic Date Due    TETANUS VACCINE  10/15/2007    Shingles Vaccine (1 of 2) Never done    RSV Vaccine (Age 60+ and Pregnant patients) (1 - Risk 60-74 years 1-dose series) Never done    COVID-19 Vaccine (3 - 2024-25 season) 2024    Mammogram  2025          Patient Care Team:  Maia Oakes MD as PCP - General (Internal Medicine)  Shira Morrlel MD as Consulting Physician (Hematology and  Oncology)      Review of Systems   Constitutional:  Negative for fatigue and fever.   HENT:  Negative for congestion, rhinorrhea, sore throat and trouble swallowing.    Eyes:  Negative for redness and visual disturbance.   Respiratory:  Negative for cough, chest tightness and shortness of breath.    Cardiovascular:  Negative for chest pain and palpitations.   Gastrointestinal:  Negative for abdominal pain, constipation, diarrhea, nausea and vomiting.   Genitourinary:  Negative for dysuria, flank pain, frequency and urgency.   Musculoskeletal:  Negative for arthralgias, gait problem and myalgias.   Skin:  Negative for rash and wound.   Neurological:  Negative for facial asymmetry, speech difficulty, weakness and headaches.   All other systems reviewed and are negative.      Objective:   /78 (BP Location: Left arm, Patient Position: Sitting)   Pulse 76   Ht 5' (1.524 m)   Wt 87.1 kg (192 lb)   SpO2 98%   BMI 37.50 kg/m²      Physical Exam  Constitutional:       General: She is not in acute distress.     Appearance: Normal appearance. She is obese.   HENT:      Right Ear: Tympanic membrane, ear canal and external ear normal.      Left Ear: Tympanic membrane, ear canal and external ear normal.      Nose: Nose normal.      Mouth/Throat:      Mouth: Mucous membranes are moist.      Pharynx: Oropharynx is clear.   Eyes:      Extraocular Movements: Extraocular movements intact.      Conjunctiva/sclera: Conjunctivae normal.      Pupils: Pupils are equal, round, and reactive to light.   Cardiovascular:      Rate and Rhythm: Normal rate and regular rhythm.      Pulses: Normal pulses.      Heart sounds: Normal heart sounds. No murmur heard.     No gallop.   Pulmonary:      Effort: Pulmonary effort is normal.      Breath sounds: Normal breath sounds. No wheezing.   Abdominal:      General: Bowel sounds are normal. There is no distension.      Palpations: Abdomen is soft. There is no mass.      Tenderness: There is no  abdominal tenderness. There is no guarding.   Musculoskeletal:         General: Normal range of motion.   Skin:     General: Skin is warm and dry.   Neurological:      Mental Status: She is alert. Mental status is at baseline.      Sensory: No sensory deficit.      Motor: No weakness.           Assessment:       ICD-10-CM ICD-9-CM   1. Wellness examination  Z00.00 V70.0   2. Vitamin D deficiency  E55.9 268.9   3. Acquired hypothyroidism  E03.9 244.9   4. Osteopenia due to cancer therapy  M85.80 733.90   5. SIRISHA (obstructive sleep apnea)  G47.33 327.23   6. Iron deficiency  E61.1 280.9   7. Class 2 severe obesity due to excess calories with serious comorbidity and body mass index (BMI) of 37.0 to 37.9 in adult  E66.812 278.01    E66.01 V85.37    Z68.37    8. Need for pneumococcal vaccination  Z23 V03.82   9. Encounter for screening for malignant neoplasm of breast, unspecified screening modality  Z12.39 V76.10        Plan:   1. Wellness examination  Assessment & Plan:  -patient feeling generally well today  -most recent wellness labs reviewed and generally stable  -age-appropriate screenings reviewed and discussed  -immunizations reviewed and discussed  -encourage routine aerobic exercise 2 to 3 times a week  -increase fluid hydration        2. Vitamin D deficiency  Assessment & Plan:  Lab Results   Component Value Date    [id number] 25 (L) 03/25/2025      -acute on chronic  -currently on currently on OTC vitamin-D however unsure and takes every other day, encouraged to utilize at least 5000 units daily        3. Acquired hypothyroidism  Assessment & Plan:  Lab Results   Component Value Date    TSH 1.762 03/25/2025    [id number] 100 06/08/2023     -controlled    -currently on levothyroxine 75 mcg daily, continue  -encouraged to take medication on empty stomach/1-hr prior other medication  -order TSH for next visit       4. Osteopenia due to cancer therapy  Assessment & Plan:  -stable  -secondary to cancer  treatment  -currently on DENOSUMAB (PROLIA) Q6M  -encourage supplementation with OTC calcium /Vitamin-D  -encourage some form of weight-bearing exercises for bone health        5. SIRISHA (obstructive sleep apnea)  Assessment & Plan:  -utilizes CPAP, continue   -encourage device hygiene  -encourage device compliance, Life time use expected  -Avoid excessive alcohol, smoking, and overuse of sedatives at bedtime  -Goal BMI <30.        6. Iron deficiency  Assessment & Plan:  -CBC stable   -currently on ferrous sulfate 325 mg daily, continue   -continue to follow on routine blood work      7. Class 2 severe obesity due to excess calories with serious comorbidity and body mass index (BMI) of 37.0 to 37.9 in adult  Assessment & Plan:  Estimated body mass index is 37.5 kg/m² as calculated from the following:    Height as of this encounter: 5' (1.524 m).    Weight as of this encounter: 87.1 kg (192 lb).   -encourage low-calorie low carb diet   -encourage some form of routine aerobic exercise        8. Need for pneumococcal vaccination  -     pneumoc 20-ana conj-dip cr(PF) (PREVNAR-20 (PF)) injection Syrg 0.5 mL    9. Encounter for screening for malignant neoplasm of breast, unspecified screening modality  -     Mammo Digital Screening Bilat w/ [facility]my (XPD); Future; Expected date: 03/25/2025            Follow up in about 6 months (around 9/25/2025) for Thyroid, Cholesterol.   -plan specifics discussed above    Orders Placed This Encounter    Mammo Digital Screening Bilat w/ Sammy (XPD)    pneumoc 20-ana conj-dip cr(PF) (PREVNAR-20 (PF)) injection Syrg 0.5 mL        Medication List with Changes/Refills   Current Medications    AMOXICILLIN-CLAVULANATE 500-125MG (AUGMENTIN) 500-125 MG TAB    Take 1 tablet (500 mg total) by mouth 3 (three) times daily.    ASPIRIN (ECOTRIN) 81 MG EC TABLET    Take 81 mg by mouth once daily.    CHOLECALCIFEROL, VITAMIN D3, 125 MCG (5,000 UNIT) TAB    Take 5,000 Units by mouth once daily.     CYANOCOBALAMIN (VITAMIN B-12) 1000 MCG TABLET    Take 100 mcg by mouth once daily.    DENOSUMAB (PROLIA) 60 MG/ML SYRG    Inject 60 mg into the skin every 6 (six) months.    FERROUS SULFATE 325 (65 FE) MG EC TABLET    Take 325 mg by mouth once daily.    LEVOTHYROXINE (SYNTHROID) 75 MCG TABLET    Take 1 tablet (75 mcg total) by mouth before breakfast.    ONDANSETRON (ZOFRAN-ODT) 4 MG TBDL    Take 1 tablet (4 mg total) by mouth every 8 (eight) hours as needed.      This note was generated with the assistance of ambient listening technology. I attest to having reviewed and edited the generated note for accuracy, though some syntax or spelling errors may persist. Please contact the author of this note for any clarification.        [1]   Social History  Tobacco Use    Smoking status: Never    Smokeless tobacco: Never    Tobacco comments:     Never   Substance Use Topics    Alcohol use: Yes    Drug use: Never

## 2025-03-25 NOTE — ASSESSMENT & PLAN NOTE
-patient feeling generally well today  -most recent wellness labs reviewed and generally stable  -age-appropriate screenings reviewed and discussed  -immunizations reviewed and discussed  -encourage routine aerobic exercise 2 to 3 times a week  -increase fluid hydration

## 2025-03-25 NOTE — ASSESSMENT & PLAN NOTE
Lab Results   Component Value Date    TSH 1.762 03/25/2025    [id number] 100 06/08/2023     -controlled    -currently on levothyroxine 75 mcg daily, continue  -encouraged to take medication on empty stomach/1-hr prior other medication  -order TSH for next visit

## 2025-03-25 NOTE — ASSESSMENT & PLAN NOTE
-stable  -secondary to cancer treatment  -currently on DENOSUMAB (PROLIA) Q6M  -encourage supplementation with OTC calcium /Vitamin-D  -encourage some form of weight-bearing exercises for bone health

## 2025-03-25 NOTE — ASSESSMENT & PLAN NOTE
-CBC stable   -currently on ferrous sulfate 325 mg daily, continue   -continue to follow on routine blood work

## 2025-03-31 ENCOUNTER — INFUSION (OUTPATIENT)
Dept: INFUSION THERAPY | Facility: HOSPITAL | Age: 65
End: 2025-03-31
Attending: NURSE PRACTITIONER
Payer: COMMERCIAL

## 2025-03-31 VITALS
TEMPERATURE: 99 F | SYSTOLIC BLOOD PRESSURE: 121 MMHG | RESPIRATION RATE: 18 BRPM | HEIGHT: 60 IN | WEIGHT: 192.88 LBS | OXYGEN SATURATION: 97 % | DIASTOLIC BLOOD PRESSURE: 54 MMHG | BODY MASS INDEX: 37.87 KG/M2 | HEART RATE: 73 BPM

## 2025-03-31 DIAGNOSIS — M85.80 OSTEOPENIA DUE TO CANCER THERAPY: Primary | ICD-10-CM

## 2025-03-31 PROCEDURE — 96372 THER/PROPH/DIAG INJ SC/IM: CPT

## 2025-03-31 PROCEDURE — 63600175 PHARM REV CODE 636 W HCPCS: Mod: JZ,TB | Performed by: NURSE PRACTITIONER

## 2025-03-31 RX ADMIN — DENOSUMAB 60 MG: 60 INJECTION SUBCUTANEOUS at 03:03

## 2025-03-31 NOTE — NURSING
Pt denied any recent dental work within the past 3 months as well as any upcoming invasive dental procedures. Prolia injection given, tolerated well.  Pt discharged in stable condition, next appt given.

## 2025-04-14 ENCOUNTER — HOSPITAL ENCOUNTER (OUTPATIENT)
Dept: RADIOLOGY | Facility: HOSPITAL | Age: 65
Discharge: HOME | End: 2025-04-14
Payer: COMMERCIAL

## 2025-04-14 DIAGNOSIS — Z12.39 ENCOUNTER FOR SCREENING FOR MALIGNANT NEOPLASM OF BREAST, UNSPECIFIED SCREENING MODALITY: ICD-10-CM

## 2025-04-14 PROCEDURE — 77067 SCR MAMMO BI INCL CAD: CPT | Mod: TC

## 2025-04-14 PROCEDURE — 77067 SCR MAMMO BI INCL CAD: CPT | Mod: 26,,, | Performed by: STUDENT IN AN ORGANIZED HEALTH CARE EDUCATION/TRAINING PROGRAM

## 2025-04-14 PROCEDURE — 77063 BREAST TOMOSYNTHESIS BI: CPT | Mod: 26,,, | Performed by: STUDENT IN AN ORGANIZED HEALTH CARE EDUCATION/TRAINING PROGRAM

## 2025-04-15 DIAGNOSIS — Z12.39 ENCOUNTER FOR SCREENING FOR MALIGNANT NEOPLASM OF BREAST, UNSPECIFIED SCREENING MODALITY: Primary | ICD-10-CM

## 2025-05-06 NOTE — PROGRESS NOTES
"Patient ID: Shahnaz Chirinos is a 65 y.o. Black or  female    Subjective  Chief Complaint: patient presents for medical weight loss management.    Contraindications to GLP-1 receptor agonist therapy:   Denies personal or family history of MTC and personal history of MEN2     Co-morbidities: SIRISHA    History of weight loss therapy:  Pt reports prior use with Contrave but denies use of any GLP-1 RA    Weight loss history:  Starting weight: 196 lbs - pt reported (BMI 38.3)    Objective  Lab Results   Component Value Date     03/25/2025     02/26/2025     03/12/2024     Lab Results   Component Value Date    K 4.0 03/25/2025    K 4.0 02/26/2025    K 3.9 03/12/2024     Lab Results   Component Value Date     03/25/2025     (H) 02/26/2025     03/12/2024     Lab Results   Component Value Date    CO2 27 03/25/2025    CO2 28 02/26/2025    CO2 26 03/12/2024     Lab Results   Component Value Date    BUN 15.0 03/25/2025    BUN 11.2 02/26/2025    BUN 13.4 03/12/2024     Lab Results   Component Value Date     03/25/2025    GLU 89 02/26/2025    GLU 78 (L) 03/12/2024     Lab Results   Component Value Date    CALCIUM 8.7 03/25/2025    CALCIUM 9.1 02/26/2025    CALCIUM 9.5 03/12/2024     Lab Results   Component Value Date    PROT 6.9 03/25/2025    PROT 6.8 02/26/2025    PROT 6.9 03/12/2024     Lab Results   Component Value Date    ALBUMIN 3.8 03/25/2025    ALBUMIN 3.9 02/26/2025    ALBUMIN 4.0 03/12/2024     Lab Results   Component Value Date    BILITOT 1.3 03/25/2025    BILITOT 1.0 02/26/2025    BILITOT 1.2 03/12/2024     Lab Results   Component Value Date    AST 20 03/25/2025    AST 19 02/26/2025    AST 27 03/12/2024     Lab Results   Component Value Date    ALT 14 03/25/2025    ALT 10 02/26/2025    ALT 15 03/12/2024     No results found for: "ANIONGAP"  Lab Results   Component Value Date    CREATININE 0.78 03/25/2025    CREATININE 0.83 02/26/2025    CREATININE 0.85 03/12/2024 "     Lab Results   Component Value Date    EGFRNORACEVR >60 03/25/2025    EGFRNORACEVR >60 02/26/2025    EGFRNORACEVR >60 03/12/2024     Assessment/Plan  -Pt qualifies for GLP-1 RA therapy based on BMI greater than or equal to 30 kg/m2  - Initiate Wegovy 0.25 mg once weekly for 1 month  - Then increase to Wegovy 0.5 mg once weekly for 1 month  - Then increase to Wegovy 1 mg once weekly  - RTC in 3 months for follow-up evaluation      Patient consented to pharmacist management via collaborative practice.

## 2025-05-07 ENCOUNTER — PATIENT MESSAGE (OUTPATIENT)
Dept: INTERNAL MEDICINE | Facility: CLINIC | Age: 65
End: 2025-05-07

## 2025-05-07 ENCOUNTER — OFFICE VISIT (OUTPATIENT)
Dept: INTERNAL MEDICINE | Facility: CLINIC | Age: 65
End: 2025-05-07
Payer: COMMERCIAL

## 2025-05-07 DIAGNOSIS — E66.812 OBESITY, CLASS II, BMI 35-39.9: Primary | ICD-10-CM

## 2025-05-07 PROCEDURE — 99499 UNLISTED E&M SERVICE: CPT | Mod: 95,,,

## 2025-05-07 RX ORDER — SEMAGLUTIDE 0.5 MG/.5ML
0.5 INJECTION, SOLUTION SUBCUTANEOUS
Qty: 2 ML | Refills: 0 | Status: ACTIVE | OUTPATIENT
Start: 2025-05-07

## 2025-05-07 RX ORDER — SEMAGLUTIDE 0.25 MG/.5ML
0.25 INJECTION, SOLUTION SUBCUTANEOUS
Qty: 2 ML | Refills: 0 | Status: ACTIVE | OUTPATIENT
Start: 2025-05-07

## 2025-05-07 RX ORDER — SEMAGLUTIDE 1 MG/.5ML
1 INJECTION, SOLUTION SUBCUTANEOUS
Qty: 2 ML | Refills: 0 | Status: ACTIVE | OUTPATIENT
Start: 2025-05-07

## 2025-05-19 ENCOUNTER — OFFICE VISIT (OUTPATIENT)
Dept: INTERNAL MEDICINE | Facility: CLINIC | Age: 65
End: 2025-05-19
Payer: COMMERCIAL

## 2025-05-19 ENCOUNTER — TELEPHONE (OUTPATIENT)
Dept: INTERNAL MEDICINE | Facility: CLINIC | Age: 65
End: 2025-05-19

## 2025-05-19 VITALS
BODY MASS INDEX: 37.23 KG/M2 | HEIGHT: 60 IN | DIASTOLIC BLOOD PRESSURE: 74 MMHG | WEIGHT: 189.63 LBS | SYSTOLIC BLOOD PRESSURE: 130 MMHG

## 2025-05-19 DIAGNOSIS — J06.9 UPPER RESPIRATORY TRACT INFECTION, UNSPECIFIED TYPE: Primary | ICD-10-CM

## 2025-05-19 LAB
FLUAV AG UPPER RESP QL IA.RAPID: NOT DETECTED
FLUBV AG UPPER RESP QL IA.RAPID: NOT DETECTED
RSV A 5' UTR RNA NPH QL NAA+PROBE: NOT DETECTED
SARS-COV-2 RNA RESP QL NAA+PROBE: NOT DETECTED

## 2025-05-19 PROCEDURE — 3078F DIAST BP <80 MM HG: CPT | Mod: CPTII,,,

## 2025-05-19 PROCEDURE — 3008F BODY MASS INDEX DOCD: CPT | Mod: CPTII,,,

## 2025-05-19 PROCEDURE — 1101F PT FALLS ASSESS-DOCD LE1/YR: CPT | Mod: CPTII,,,

## 2025-05-19 PROCEDURE — 99213 OFFICE O/P EST LOW 20 MIN: CPT | Mod: ,,,

## 2025-05-19 PROCEDURE — 3044F HG A1C LEVEL LT 7.0%: CPT | Mod: CPTII,,,

## 2025-05-19 PROCEDURE — 0241U COVID/RSV/FLU A&B PCR: CPT

## 2025-05-19 PROCEDURE — 1159F MED LIST DOCD IN RCRD: CPT | Mod: CPTII,,,

## 2025-05-19 PROCEDURE — 3288F FALL RISK ASSESSMENT DOCD: CPT | Mod: CPTII,,,

## 2025-05-19 PROCEDURE — 1160F RVW MEDS BY RX/DR IN RCRD: CPT | Mod: CPTII,,,

## 2025-05-19 PROCEDURE — 3075F SYST BP GE 130 - 139MM HG: CPT | Mod: CPTII,,,

## 2025-05-19 RX ORDER — AZITHROMYCIN 250 MG/1
TABLET, FILM COATED ORAL
Qty: 6 TABLET | Refills: 0 | Status: SHIPPED | OUTPATIENT
Start: 2025-05-19 | End: 2025-05-24

## 2025-05-19 RX ORDER — LORATADINE PSEUDOEPHEDRINE SULFATE 10; 240 MG/1; MG/1
1 TABLET, EXTENDED RELEASE ORAL DAILY
COMMUNITY
Start: 2025-05-19 | End: 2025-05-29

## 2025-05-19 RX ORDER — BENZONATATE 200 MG/1
200 CAPSULE ORAL 3 TIMES DAILY PRN
Qty: 21 CAPSULE | Refills: 0 | Status: SHIPPED | OUTPATIENT
Start: 2025-05-19 | End: 2025-05-26

## 2025-05-19 NOTE — TELEPHONE ENCOUNTER
Spoke with pt regarding symptoms pt is experiencing sinus issues. Assisted pt in scheduling appt to be seen for symptoms she is experiencing

## 2025-05-19 NOTE — TELEPHONE ENCOUNTER
Copied from CRM #[id number]. Topic: General Inquiry - Patient Advice  >> May 19, 2025  8:53 AM Lily wrote:  Who Called: Shahnaz Chirinos    Caller is requesting assistance/information from provider's office.    Symptoms (please be specific): cough, nasal drip   How long has patient had these symptoms:  2 days  List of preferred pharmacies on file (remove unneeded): [facility]/pharmacy #[id number] - [place] - [address] AT [address] [facility] [address]   Phone: [phone]  Fax: [phone]        If different, enter pharmacy into here including location and phone number:       Preferred Method of Contact: Phone Call  Patient's Preferred Phone Number on File: [phone]   Best Call Back Number, if different:  Additional Information: Patient is requesting some medication be called into pharmacy states she has a nasal drip and a persistent cough. States she has tried OTC medication nothing has helped.

## 2025-05-20 ENCOUNTER — RESULTS FOLLOW-UP (OUTPATIENT)
Dept: INTERNAL MEDICINE | Facility: CLINIC | Age: 65
End: 2025-05-20

## 2025-05-20 NOTE — PROGRESS NOTES
COVID/flu/influenza viral swab negative.  Continue treating as discussed during yesterday's visit.

## 2025-05-20 NOTE — PROGRESS NOTES
Patient ID: Shahnaz Chirinos is a 65 y.o. female.    Chief Complaint: Sinus Problem      65-year-old female here today for a hypothyroidism follow-up visit. Medical comorbidities include hypothyroidism, vitamin D deficiency, GERD, SIRISHA, DDD, and atypical ductal hyperplasia of her left breast    URI   This is a new problem. The current episode started 1 to 4 weeks ago. The problem has been unchanged. There has been no fever. The cough is Productive of sputum. Associated symptoms include congestion, coughing, headaches, a plugged ear sensation, rhinorrhea and a sore throat. Pertinent negatives include no abdominal pain, chest pain, diarrhea, dysuria, nausea, rash, vomiting or wheezing. She has tried decongestant for the symptoms. The treatment provided mild relief.              Wellness:2025      MEDICAL HISTORY:    Past Medical History:   Diagnosis Date    Abdominal pain     Achilles tendonitis     Acquired hypothyroidism     Acute sinusitis     Cholelithiasis     DCIS (ductal carcinoma in situ)     Gastric reflux     Malaise and fatigue     Osteopenia     Vitamin D deficiency       Past Surgical History:   Procedure Laterality Date    BREAST SURGERY  2019     SECTION      CHOLECYSTECTOMY      EXC. OF BREAST LESION ID BY RADIOLOGICAL Left     KNEE SURGERY      LAPAROSCOPIC CHOLECYSTECTOMY      TOTAL ABDOMINAL HYSTERECTOMY      Per [facility]    TUBAL LIGATION        Social History[1]       Health Maintenance Due   Topic Date Due    Hepatitis C Screening  Never done    TETANUS VACCINE  10/15/2007    Shingles Vaccine (1 of 2) Never done    RSV Vaccine (Age 60+ and Pregnant patients) (1 - Risk 60-74 years 1-dose series) Never done    COVID-19 Vaccine (3 - 2024-25 season) 2024          Patient Care Team:  Maia Oakes MD as PCP - General (Internal Medicine)  Shira Morrell MD as Consulting Physician (Hematology and Oncology)  Turner Peacock, PharmD as  Pharmacist      Review of Systems   Constitutional:  Positive for fatigue. Negative for activity change, chills and fever.   HENT:  Positive for congestion, postnasal drip, rhinorrhea, sinus pressure and sore throat. Negative for trouble swallowing.    Eyes:  Negative for redness and visual disturbance.   Respiratory:  Positive for cough. Negative for chest tightness, shortness of breath and wheezing.    Cardiovascular:  Negative for chest pain and palpitations.   Gastrointestinal:  Negative for abdominal pain, constipation, diarrhea, nausea and vomiting.   Genitourinary:  Negative for dysuria, flank pain, frequency and urgency.   Musculoskeletal:  Negative for arthralgias, gait problem and myalgias.   Skin:  Negative for rash and wound.   Neurological:  Positive for headaches. Negative for facial asymmetry, speech difficulty and weakness.   All other systems reviewed and are negative.      Objective:   /74 (BP Location: Left arm, Patient Position: Sitting)   Ht 5' (1.524 m)   Wt 86 kg (189 lb 9.6 oz)   BMI 37.03 kg/m²      Physical Exam  Constitutional:       General: She is not in acute distress.     Appearance: Normal appearance.   HENT:      Right Ear: Tympanic membrane, ear canal and external ear normal.      Left Ear: Tympanic membrane, ear canal and external ear normal.      Nose: Nose normal.      Mouth/Throat:      Mouth: Mucous membranes are moist.      Pharynx: Oropharynx is clear. Posterior oropharyngeal erythema present. No pharyngeal swelling, oropharyngeal exudate or uvula swelling.      Tonsils: No tonsillar exudate or tonsillar abscesses.   Eyes:      Extraocular Movements: Extraocular movements intact.      Conjunctiva/sclera: Conjunctivae normal.      Pupils: Pupils are equal, round, and reactive to light.   Cardiovascular:      Rate and Rhythm: Normal rate and regular rhythm.      Pulses: Normal pulses.      Heart sounds: Normal heart sounds. No murmur heard.     No gallop.   Pulmonary:       Effort: Pulmonary effort is normal.      Breath sounds: Normal breath sounds. No wheezing.   Abdominal:      General: Bowel sounds are normal. There is no distension.      Palpations: Abdomen is soft. There is no mass.      Tenderness: There is no abdominal tenderness. There is no guarding.   Musculoskeletal:         General: Normal range of motion.   Skin:     General: Skin is warm and dry.   Neurological:      Mental Status: She is alert. Mental status is at baseline.      Sensory: No sensory deficit.      Motor: No weakness.           Assessment:       ICD-10-CM ICD-9-CM   1. Upper respiratory tract infection, unspecified type  J06.9 465.9        Plan:   1. Upper respiratory tract infection, unspecified type  Assessment & Plan:  -obtain COVID/RSV/FLU PCR  -azithromycin pack if viral swab negative  -initiate daily antihistamine  -Okay to utilize Flonase BID  -initiate Tessalon p.r.n. cough  -Steam inhalation  -Tylenol/ibuprofen for body aches and low-grade temps  -okay to utilize Vit C , Vit D, and Zinc  -safety precautions/quarantine discussed   -notify clinic for any new or worsening symptoms     Orders:  -     benzonatate (TESSALON) 200 MG capsule; Take 1 capsule (200 mg total) by mouth 3 (three) times daily as needed for Cough.  Dispense: 21 capsule; Refill: 0  -     azithromycin (Z-DAVIAN) 250 MG tablet; Take 2 tablets by mouth on day 1; Take 1 tablet by mouth on days 2-5  Dispense: 6 tablet; Refill: 0  -     loratadine-pseudoephedrine  mg (CLARITIN-D 24 HOUR)  mg per 24 hr tablet; Take 1 tablet by mouth once daily. for 10 days  -     COVID/RSV/FLU A&B PCR; Future; Expected date: 05/19/2025            Follow up for Previously scheduled and PRN if need.   -plan specifics discussed above    Orders Placed This Encounter    COVID/RSV/FLU A&B PCR    benzonatate (TESSALON) 200 MG capsule    azithromycin (Z-DAVIAN) 250 MG tablet    loratadine-pseudoephedrine  mg (CLARITIN-D 24 HOUR)  mg per 24  hr tablet        Medication List with Changes/Refills   New Medications    AZITHROMYCIN (Z-DAVIAN) 250 MG TABLET    Take 2 tablets by mouth on day 1; Take 1 tablet by mouth on days 2-5    BENZONATATE (TESSALON) 200 MG CAPSULE    Take 1 capsule (200 mg total) by mouth 3 (three) times daily as needed for Cough.    LORATADINE-PSEUDOEPHEDRINE  MG (CLARITIN-D 24 HOUR)  MG PER 24 HR TABLET    Take 1 tablet by mouth once daily. for 10 days   Current Medications    AMOXICILLIN-CLAVULANATE 500-125MG (AUGMENTIN) 500-125 MG TAB    Take 1 tablet (500 mg total) by mouth 3 (three) times daily.    ASPIRIN (ECOTRIN) 81 MG EC TABLET    Take 81 mg by mouth once daily.    CHOLECALCIFEROL, VITAMIN D3, 125 MCG (5,000 UNIT) TAB    Take 5,000 Units by mouth once daily.    CYANOCOBALAMIN (VITAMIN B-12) 1000 MCG TABLET    Take 100 mcg by mouth once daily.    DENOSUMAB (PROLIA) 60 MG/ML SYRG    Inject 60 mg into the skin every 6 (six) months.    FERROUS SULFATE 325 (65 FE) MG EC TABLET    Take 325 mg by mouth once daily.    LEVOTHYROXINE (SYNTHROID) 75 MCG TABLET    Take 1 tablet (75 mcg total) by mouth before breakfast.    ONDANSETRON (ZOFRAN-ODT) 4 MG TBDL    Take 1 tablet (4 mg total) by mouth every 8 (eight) hours as needed.    SEMAGLUTIDE, WEIGHT LOSS, (WEGOVY) 0.25 MG/0.5 ML PNIJ    Inject 0.25 mg into the skin every 7 days.    SEMAGLUTIDE, WEIGHT LOSS, (WEGOVY) 0.5 MG/0.5 ML PNIJ    Inject 0.5 mg into the skin every 7 days.    SEMAGLUTIDE, WEIGHT LOSS, (WEGOVY) 1 MG/0.5 ML PNIJ    Inject 1 mg into the skin every 7 days.      This note was generated with the assistance of ambient listening technology. I attest to having reviewed and edited the generated note for accuracy, though some syntax or spelling errors may persist. Please contact the author of this note for any clarification.        [1]   Social History  Tobacco Use    Smoking status: Never    Smokeless tobacco: Never    Tobacco comments:     Never   Substance Use  Topics    Alcohol use: Yes    Drug use: Never

## 2025-05-20 NOTE — ASSESSMENT & PLAN NOTE
-obtain COVID/RSV/FLU PCR  -azithromycin pack if viral swab negative  -initiate daily antihistamine  -Okay to utilize Flonase BID  -initiate Tessalon p.r.n. cough  -Steam inhalation  -Tylenol/ibuprofen for body aches and low-grade temps  -okay to utilize Vit C , Vit D, and Zinc  -safety precautions/quarantine discussed   -notify clinic for any new or worsening symptoms

## 2025-07-23 DIAGNOSIS — M85.80 OSTEOPENIA DUE TO CANCER THERAPY: Primary | ICD-10-CM

## 2025-08-01 NOTE — PROGRESS NOTES
Patient ID: Shahnaz Chirinos is a 65 y.o. Black or  female    Subjective  Chief Complaint: patient presents for medical weight loss management.    Pt arrived to the virtual appointment but left without being seen. Pt was unable to reached via phone. Will send pt a new link to reschedule.   
WFL

## 2025-08-05 ENCOUNTER — OFFICE VISIT (OUTPATIENT)
Dept: INTERNAL MEDICINE | Facility: CLINIC | Age: 65
End: 2025-08-05
Payer: COMMERCIAL

## 2025-08-05 ENCOUNTER — PATIENT MESSAGE (OUTPATIENT)
Dept: INTERNAL MEDICINE | Facility: CLINIC | Age: 65
End: 2025-08-05

## 2025-08-05 DIAGNOSIS — E66.811 OBESITY, CLASS I, BMI 30-34.9: Primary | ICD-10-CM

## 2025-08-05 PROCEDURE — 99499 UNLISTED E&M SERVICE: CPT | Mod: 95,,, | Performed by: PHARMACIST

## 2025-08-19 ENCOUNTER — TELEPHONE (OUTPATIENT)
Dept: INTERNAL MEDICINE | Facility: CLINIC | Age: 65
End: 2025-08-19

## 2025-08-19 ENCOUNTER — OFFICE VISIT (OUTPATIENT)
Dept: INTERNAL MEDICINE | Facility: CLINIC | Age: 65
End: 2025-08-19
Payer: COMMERCIAL

## 2025-08-19 VITALS
TEMPERATURE: 99 F | WEIGHT: 183 LBS | OXYGEN SATURATION: 98 % | SYSTOLIC BLOOD PRESSURE: 116 MMHG | BODY MASS INDEX: 35.93 KG/M2 | HEIGHT: 60 IN | DIASTOLIC BLOOD PRESSURE: 72 MMHG | HEART RATE: 98 BPM

## 2025-08-19 DIAGNOSIS — R68.83 CHILLS: ICD-10-CM

## 2025-08-19 DIAGNOSIS — R50.9 FEVER, UNSPECIFIED FEVER CAUSE: ICD-10-CM

## 2025-08-19 DIAGNOSIS — J06.9 UPPER RESPIRATORY TRACT INFECTION, UNSPECIFIED TYPE: ICD-10-CM

## 2025-08-19 DIAGNOSIS — J02.9 SORE THROAT: ICD-10-CM

## 2025-08-19 DIAGNOSIS — B99.9 FEVER DUE TO INFECTION: Primary | ICD-10-CM

## 2025-08-19 LAB
CTP QC/QA: YES
FLUAV AG UPPER RESP QL IA.RAPID: NOT DETECTED
FLUBV AG UPPER RESP QL IA.RAPID: NOT DETECTED
RSV A 5' UTR RNA NPH QL NAA+PROBE: NOT DETECTED
S PYO RRNA THROAT QL PROBE: NEGATIVE
SARS-COV-2 RNA RESP QL NAA+PROBE: DETECTED

## 2025-08-19 PROCEDURE — 3288F FALL RISK ASSESSMENT DOCD: CPT | Mod: CPTII,,,

## 2025-08-19 PROCEDURE — 87637 SARSCOV2&INF A&B&RSV AMP PRB: CPT

## 2025-08-19 PROCEDURE — 3078F DIAST BP <80 MM HG: CPT | Mod: CPTII,,,

## 2025-08-19 PROCEDURE — 1101F PT FALLS ASSESS-DOCD LE1/YR: CPT | Mod: CPTII,,,

## 2025-08-19 PROCEDURE — 3008F BODY MASS INDEX DOCD: CPT | Mod: CPTII,,,

## 2025-08-19 PROCEDURE — 87880 STREP A ASSAY W/OPTIC: CPT | Mod: QW,,,

## 2025-08-19 PROCEDURE — 3044F HG A1C LEVEL LT 7.0%: CPT | Mod: CPTII,,,

## 2025-08-19 PROCEDURE — 99214 OFFICE O/P EST MOD 30 MIN: CPT | Mod: ,,,

## 2025-08-19 PROCEDURE — 1159F MED LIST DOCD IN RCRD: CPT | Mod: CPTII,,,

## 2025-08-19 PROCEDURE — 3074F SYST BP LT 130 MM HG: CPT | Mod: CPTII,,,

## 2025-08-19 RX ORDER — CETIRIZINE HYDROCHLORIDE 10 MG/1
10 TABLET ORAL NIGHTLY
Start: 2025-08-19 | End: 2025-09-02

## 2025-08-19 RX ORDER — FLUTICASONE PROPIONATE 50 MCG
1 SPRAY, SUSPENSION (ML) NASAL 2 TIMES DAILY
Qty: 16 ML | Refills: 0 | Status: SHIPPED | OUTPATIENT
Start: 2025-08-19 | End: 2025-08-26

## 2025-08-19 RX ORDER — PROMETHAZINE HYDROCHLORIDE AND DEXTROMETHORPHAN HYDROBROMIDE 6.25; 15 MG/5ML; MG/5ML
5 SYRUP ORAL EVERY 4 HOURS PRN
Qty: 118 ML | Refills: 0 | Status: SHIPPED | OUTPATIENT
Start: 2025-08-19 | End: 2025-08-26

## 2025-08-21 ENCOUNTER — TELEPHONE (OUTPATIENT)
Dept: INTERNAL MEDICINE | Facility: CLINIC | Age: 65
End: 2025-08-21
Payer: COMMERCIAL

## 2025-08-21 ENCOUNTER — E-VISIT (OUTPATIENT)
Dept: INTERNAL MEDICINE | Facility: CLINIC | Age: 65
End: 2025-08-21
Payer: COMMERCIAL

## 2025-08-21 ENCOUNTER — TELEPHONE (OUTPATIENT)
Dept: INTERNAL MEDICINE | Facility: CLINIC | Age: 65
End: 2025-08-21

## 2025-08-21 DIAGNOSIS — U07.1 COVID: Primary | ICD-10-CM

## 2025-08-21 RX ORDER — IBUPROFEN 400 MG/1
400 TABLET, FILM COATED ORAL EVERY 6 HOURS PRN
Qty: 28 TABLET | Refills: 0 | Status: SHIPPED | OUTPATIENT
Start: 2025-08-21 | End: 2025-08-28

## 2025-08-21 RX ORDER — PANTOPRAZOLE SODIUM 40 MG/1
40 TABLET, DELAYED RELEASE ORAL DAILY
Qty: 30 TABLET | Refills: 1 | Status: SHIPPED | OUTPATIENT
Start: 2025-08-21 | End: 2026-08-21

## 2025-08-21 RX ORDER — PREDNISONE 20 MG/1
20 TABLET ORAL DAILY
Qty: 5 TABLET | Refills: 0 | Status: SHIPPED | OUTPATIENT
Start: 2025-08-21

## 2025-08-24 ENCOUNTER — OFFICE VISIT (OUTPATIENT)
Dept: URGENT CARE | Facility: CLINIC | Age: 65
End: 2025-08-24
Payer: COMMERCIAL

## 2025-08-24 ENCOUNTER — NURSE TRIAGE (OUTPATIENT)
Dept: ADMINISTRATIVE | Facility: CLINIC | Age: 65
End: 2025-08-24
Payer: COMMERCIAL

## 2025-08-24 VITALS
BODY MASS INDEX: 35.93 KG/M2 | TEMPERATURE: 99 F | DIASTOLIC BLOOD PRESSURE: 85 MMHG | HEIGHT: 60 IN | SYSTOLIC BLOOD PRESSURE: 130 MMHG | HEART RATE: 85 BPM | OXYGEN SATURATION: 99 % | RESPIRATION RATE: 18 BRPM | WEIGHT: 183 LBS

## 2025-08-24 DIAGNOSIS — K59.00 CONSTIPATION, UNSPECIFIED CONSTIPATION TYPE: Primary | ICD-10-CM

## 2025-08-24 PROCEDURE — 99203 OFFICE O/P NEW LOW 30 MIN: CPT | Mod: ,,, | Performed by: NURSE PRACTITIONER

## 2025-08-24 RX ORDER — LACTULOSE 10 G/15ML
20 SOLUTION ORAL; RECTAL 2 TIMES DAILY PRN
Qty: 120 ML | Refills: 0 | Status: SHIPPED | OUTPATIENT
Start: 2025-08-24 | End: 2025-08-26

## 2025-08-29 ENCOUNTER — TELEPHONE (OUTPATIENT)
Dept: INTERNAL MEDICINE | Facility: CLINIC | Age: 65
End: 2025-08-29
Payer: COMMERCIAL

## 2025-09-02 ENCOUNTER — HOSPITAL ENCOUNTER (OUTPATIENT)
Dept: RADIOLOGY | Facility: HOSPITAL | Age: 65
Discharge: HOME | End: 2025-09-02
Attending: INTERNAL MEDICINE
Payer: COMMERCIAL

## 2025-09-02 ENCOUNTER — OFFICE VISIT (OUTPATIENT)
Dept: HEMATOLOGY/ONCOLOGY | Facility: CLINIC | Age: 65
End: 2025-09-02
Payer: COMMERCIAL

## 2025-09-02 VITALS
HEART RATE: 83 BPM | DIASTOLIC BLOOD PRESSURE: 78 MMHG | RESPIRATION RATE: 18 BRPM | HEIGHT: 60 IN | WEIGHT: 185.5 LBS | OXYGEN SATURATION: 97 % | SYSTOLIC BLOOD PRESSURE: 124 MMHG | BODY MASS INDEX: 36.42 KG/M2 | TEMPERATURE: 98 F

## 2025-09-02 DIAGNOSIS — N60.92 ATYPICAL DUCTAL HYPERPLASIA OF LEFT BREAST: Primary | ICD-10-CM

## 2025-09-02 DIAGNOSIS — M85.80 OSTEOPENIA DUE TO CANCER THERAPY: Chronic | ICD-10-CM

## 2025-09-02 DIAGNOSIS — Z91.89 AT HIGH RISK FOR BREAST CANCER: ICD-10-CM

## 2025-09-02 DIAGNOSIS — M85.80 OSTEOPENIA DUE TO CANCER THERAPY: ICD-10-CM

## 2025-09-02 PROCEDURE — 3074F SYST BP LT 130 MM HG: CPT | Mod: CPTII,S$GLB,, | Performed by: NURSE PRACTITIONER

## 2025-09-02 PROCEDURE — 99999 PR PBB SHADOW E&M-EST. PATIENT-LVL V: CPT | Mod: PBBFAC,,, | Performed by: NURSE PRACTITIONER

## 2025-09-02 PROCEDURE — 3008F BODY MASS INDEX DOCD: CPT | Mod: CPTII,S$GLB,, | Performed by: NURSE PRACTITIONER

## 2025-09-02 PROCEDURE — 1160F RVW MEDS BY RX/DR IN RCRD: CPT | Mod: CPTII,S$GLB,, | Performed by: NURSE PRACTITIONER

## 2025-09-02 PROCEDURE — 99214 OFFICE O/P EST MOD 30 MIN: CPT | Mod: S$GLB,,, | Performed by: NURSE PRACTITIONER

## 2025-09-02 PROCEDURE — 1159F MED LIST DOCD IN RCRD: CPT | Mod: CPTII,S$GLB,, | Performed by: NURSE PRACTITIONER

## 2025-09-02 PROCEDURE — 1101F PT FALLS ASSESS-DOCD LE1/YR: CPT | Mod: CPTII,S$GLB,, | Performed by: NURSE PRACTITIONER

## 2025-09-02 PROCEDURE — 77080 DXA BONE DENSITY AXIAL: CPT | Mod: TC

## 2025-09-02 PROCEDURE — 3078F DIAST BP <80 MM HG: CPT | Mod: CPTII,S$GLB,, | Performed by: NURSE PRACTITIONER

## 2025-09-02 PROCEDURE — 3044F HG A1C LEVEL LT 7.0%: CPT | Mod: CPTII,S$GLB,, | Performed by: NURSE PRACTITIONER

## 2025-09-02 PROCEDURE — 3288F FALL RISK ASSESSMENT DOCD: CPT | Mod: CPTII,S$GLB,, | Performed by: NURSE PRACTITIONER

## 2025-09-02 PROCEDURE — 77081 DXA BONE DENSITY APPENDICULR: CPT | Mod: XU,TC
